# Patient Record
Sex: MALE | Race: WHITE | Employment: FULL TIME | URBAN - METROPOLITAN AREA
[De-identification: names, ages, dates, MRNs, and addresses within clinical notes are randomized per-mention and may not be internally consistent; named-entity substitution may affect disease eponyms.]

---

## 2019-11-21 ENCOUNTER — OFFICE VISIT (OUTPATIENT)
Dept: PEDIATRICS | Facility: CLINIC | Age: 39
End: 2019-11-21
Payer: COMMERCIAL

## 2019-11-21 VITALS
TEMPERATURE: 97.9 F | WEIGHT: 234.9 LBS | SYSTOLIC BLOOD PRESSURE: 114 MMHG | HEIGHT: 70 IN | BODY MASS INDEX: 33.63 KG/M2 | RESPIRATION RATE: 12 BRPM | OXYGEN SATURATION: 95 % | HEART RATE: 85 BPM | DIASTOLIC BLOOD PRESSURE: 80 MMHG

## 2019-11-21 DIAGNOSIS — F43.21 GRIEF REACTION: ICD-10-CM

## 2019-11-21 DIAGNOSIS — Z00.00 ROUTINE GENERAL MEDICAL EXAMINATION AT A HEALTH CARE FACILITY: Primary | ICD-10-CM

## 2019-11-21 DIAGNOSIS — Z72.0 TOBACCO ABUSE: ICD-10-CM

## 2019-11-21 DIAGNOSIS — Z23 NEED FOR TETANUS BOOSTER: ICD-10-CM

## 2019-11-21 DIAGNOSIS — Z82.49 FAMILY HISTORY OF ISCHEMIC HEART DISEASE: ICD-10-CM

## 2019-11-21 DIAGNOSIS — F34.1 DYSTHYMIA: ICD-10-CM

## 2019-11-21 DIAGNOSIS — Z12.5 SCREENING FOR PROSTATE CANCER: ICD-10-CM

## 2019-11-21 PROCEDURE — G0103 PSA SCREENING: HCPCS | Performed by: NURSE PRACTITIONER

## 2019-11-21 PROCEDURE — 80048 BASIC METABOLIC PNL TOTAL CA: CPT | Performed by: NURSE PRACTITIONER

## 2019-11-21 PROCEDURE — 80061 LIPID PANEL: CPT | Performed by: NURSE PRACTITIONER

## 2019-11-21 PROCEDURE — 99386 PREV VISIT NEW AGE 40-64: CPT | Performed by: NURSE PRACTITIONER

## 2019-11-21 PROCEDURE — 36415 COLL VENOUS BLD VENIPUNCTURE: CPT | Performed by: NURSE PRACTITIONER

## 2019-11-21 PROCEDURE — 99213 OFFICE O/P EST LOW 20 MIN: CPT | Mod: 25 | Performed by: NURSE PRACTITIONER

## 2019-11-21 RX ORDER — NICOTINE 21 MG/24HR
1 PATCH, TRANSDERMAL 24 HOURS TRANSDERMAL EVERY 24 HOURS
Qty: 21 PATCH | Refills: 1 | Status: SHIPPED | OUTPATIENT
Start: 2019-11-21 | End: 2021-02-02

## 2019-11-21 SDOH — HEALTH STABILITY: MENTAL HEALTH: HOW OFTEN DO YOU HAVE A DRINK CONTAINING ALCOHOL?: MONTHLY OR LESS

## 2019-11-21 SDOH — HEALTH STABILITY: MENTAL HEALTH: HOW MANY STANDARD DRINKS CONTAINING ALCOHOL DO YOU HAVE ON A TYPICAL DAY?: 1 OR 2

## 2019-11-21 ASSESSMENT — ENCOUNTER SYMPTOMS: NERVOUS/ANXIOUS: 1

## 2019-11-21 ASSESSMENT — MIFFLIN-ST. JEOR: SCORE: 1976.75

## 2019-11-21 NOTE — PROGRESS NOTES
SUBJECTIVE:   CC: Felix Winslow is an 39 year old male who presents for preventative health visit.     Healthy Habits:     Getting at least 3 servings of Calcium per day:  Yes    Bi-annual eye exam:  NO    Dental care twice a year:  NO    Sleep apnea or symptoms of sleep apnea:  None    Diet:  Carbohydrate counting    Frequency of exercise:  2-3 days/week    Duration of exercise:  30-45 minutes    Taking medications regularly:  Yes    Barriers to taking medications:  None    Medication side effects:  Not applicable    PHQ-2 Total Score: 1    Additional concerns today:  Yes    Seen at  ED in July of this year for chest pain  Workup unremarkable  Father had MI at 72 and his paternal grandfather had MI in his 50s  His father passed away 3 years ago and then his mother passed away 2 years ago from metastatic cancer (unsure of primary)  This has caused him a lot of grief and fear of his own mortality  He states he often thinks about death  He is scheduled for first visit with a therapist today    He uses tobacco chew and wants to quit    He has felt unwell for the past week  Hard to describe  Wakes up with chest pressure but has also been exercising daily without any symptoms  Reports that he feels better when exercising  He drinks 4 cups of coffee daily and feels like he urinates frequently during the day  He did not drink any coffee yesterday and did not have urinary frequency  Otherwise denies fever, chills, nausea, vomiting, unintended weight loss, night sweats, cough, SOB, body aches    He is a  for the [employer]  Had his physical last year through the team doctor  States that he has been having PSAs and would like this done  No family history of prostate cancer        Today's PHQ-2 Score:   PHQ-2 ( 1999 Pfizer) 11/21/2019   Q1: Little interest or pleasure in doing things 1   Q2: Feeling down, depressed or hopeless 0   PHQ-2 Score 1   Q1: Little interest or pleasure in doing things Several days   Q2:  Feeling down, depressed or hopeless Not at all   PHQ-2 Score 1       Abuse: Current or Past(Physical, Sexual or Emotional)- No  Do you feel safe in your environment? Yes        Social History     Tobacco Use     Smoking status: Never Smoker     Smokeless tobacco: Current User   Substance Use Topics     Alcohol use: Yes     Frequency: Monthly or less     Drinks per session: 1 or 2         Alcohol Use 11/21/2019   Prescreen: >3 drinks/day or >7 drinks/week? No       Last PSA: No results found for: PSA    Reviewed orders with patient. Reviewed health maintenance and updated orders accordingly - Yes  Lab work is in process  Labs reviewed in EPIC  BP Readings from Last 3 Encounters:   11/21/19 114/80    Wt Readings from Last 3 Encounters:   11/21/19 106.5 kg (234 lb 14.4 oz)                  There is no problem list on file for this patient.    No past surgical history on file.    Social History     Tobacco Use     Smoking status: Never Smoker     Smokeless tobacco: Current User   Substance Use Topics     Alcohol use: Yes     Frequency: Monthly or less     Drinks per session: 1 or 2     Family History   Problem Relation Age of Onset     Cancer Mother      Myocardial Infarction Father 72     Diabetes Father      Emphysema Maternal Grandmother      Myocardial Infarction Paternal Grandfather            Reviewed and updated as needed this visit by clinical staff  Tobacco  Allergies  Soc Hx        Reviewed and updated as needed this visit by Provider        No past medical history on file.     Review of Systems  CONSTITUTIONAL: NEGATIVE for fever, chills, change in weight  INTEGUMENTARY/SKIN: NEGATIVE for worrisome rashes, moles or lesions  EYES: NEGATIVE for vision changes or irritation  ENT: NEGATIVE for ear, mouth and throat problems  RESP: NEGATIVE for significant cough or SOB  CV: NEGATIVE for chest pain, palpitations or peripheral edema  GI: NEGATIVE for nausea, abdominal pain, heartburn, or change in bowel habits    "male: negative for dysuria, hematuria, decreased urinary stream, erectile dysfunction, urethral discharge  MUSCULOSKELETAL: NEGATIVE for significant arthralgias or myalgia  NEURO: NEGATIVE for weakness, dizziness or paresthesias  PSYCHIATRIC: NEGATIVE for changes in mood or affect    OBJECTIVE:   /80 (BP Location: Right arm, Patient Position: Chair, Cuff Size: Adult Regular)   Pulse 85   Temp 97.9  F (36.6  C) (Oral)   Resp 12   Ht 1.778 m (5' 10\")   Wt 106.5 kg (234 lb 14.4 oz)   SpO2 95%   BMI 33.70 kg/m      Physical Exam  GENERAL: healthy, alert and no distress  EYES: Eyes grossly normal to inspection, PERRL and conjunctivae and sclerae normal  HENT: ear canals and TM's normal, nose and mouth without ulcers or lesions  NECK: no adenopathy, no asymmetry, masses, or scars and thyroid normal to palpation  RESP: lungs clear to auscultation - no rales, rhonchi or wheezes  CV: regular rate and rhythm, normal S1 S2, no S3 or S4, no murmur, click or rub, no peripheral edema and peripheral pulses strong  ABDOMEN: soft, nontender, no hepatosplenomegaly, no masses and bowel sounds normal  MS: no gross musculoskeletal defects noted, no edema  SKIN: no suspicious lesions or rashes  NEURO: Normal strength and tone, mentation intact and speech normal  PSYCH: mentation appears normal, affect normal/bright    Diagnostic Test Results:  none     ASSESSMENT/PLAN:   1. Routine general medical examination at a health care facility  - Lipid panel reflex to direct LDL Fasting  - Basic metabolic panel  - PSA, screen    2. Tobacco abuse  - Strongly encourage cessation, especially with family history of ASCVD. Discussed medications to quit. Will try nicotine patch and consider bupropion. He will discuss this with his therapist  - nicotine (NICODERM CQ) 21 MG/24HR 24 hr patch; Place 1 patch onto the skin every 24 hours  Dispense: 21 patch; Refill: 1    3. Family history of ischemic heart disease  - Recommend exercising 150 " "minutes/week. Discussed reducing risk factors including overweight, tobacco and checking lipids. Check with insurance on coverage for screening at [facility]  - CARDIOLOGY EVAL ADULT REFERRAL    4. Need for tetanus booster  - Not needed. Boostrix in 2015    5. Screening for prostate cancer  - Advised that PSA screening not recommended until age 50, but I will check per patient request    6. Grief reaction  - Seeing therapist today. May take several sessions to start seeing improvement. He has a lot of anxiety around his own health. Asks about stress testing and chest imaging with his family history. Advised this would not be recommended as he is asymptomatic. Close monitoring. If he is able to get into regular exercise routine and tolerate without symptoms, and having progress with therapist, ok to f/u next year for annual exam. Otherwise, f/u sooner. He will notify me if becoming symptomatic with exercise, or dysthymia worsening    7. Dysthymia  - see above      COUNSELING:   Reviewed preventive health counseling, as reflected in patient instructions       Regular exercise       Healthy diet/nutrition       Prostate cancer screening    Estimated body mass index is 33.7 kg/m  as calculated from the following:    Height as of this encounter: 1.778 m (5' 10\").    Weight as of this encounter: 106.5 kg (234 lb 14.4 oz).     Weight management plan: Discussed healthy diet and exercise guidelines     reports that he has never smoked. He uses smokeless tobacco.  Tobacco Cessation Action Plan: Pharmacotherapies : Nicotine patch    Counseling Resources:  ATP IV Guidelines  Pooled Cohorts Equation Calculator  FRAX Risk Assessment  ICSI Preventive Guidelines  Dietary Guidelines for Americans, 2010  USDA's MyPlate  ASA Prophylaxis  Lung CA Screening    OFELIA Garsia [facility] [place]  "

## 2019-11-21 NOTE — PATIENT INSTRUCTIONS
Apply 1 nicotine patch every morning. Apply new patch every morning  Wear 21 mg patch daily for 6 weeks then reduce to 14 mg patch  Call clinic in 6 weeks and I can send in 14 mg patch    Let me know if you want to try adding Bupropion (Wellbutrin)  You can discuss this with your therapist as well    I put in a referral for [facility]. You can call and ask about insurance coverage  The most important things you can do for your heart and your body are to quit tobacco and get into an exercise routine    Work on increasing exercise. You should be exercise 150 minutes/week  Let me know if you are having symptoms with exercise such as shortness of breath, chest pressure or fatigue    Let me know if you are not feeling better. Don't hesitate to call clinic or send me a finalsitet message  If you are feeling well, follow up for annual exam in 1 year, otherwise come see me sooner      Preventive Health Recommendations  Male Ages 40 to 49    Yearly exam:             See your health care provider every year in order to  o   Review health changes.   o   Discuss preventive care.    o   Review your medicines if your doctor has prescribed any.    You should be tested each year for STDs (sexually transmitted diseases) if you re at risk.     Have a cholesterol test every 5 years.     Have a colonoscopy (test for colon cancer) if someone in your family has had colon cancer or polyps before age 50.     After age 45, have a diabetes test (fasting glucose). If you are at risk for diabetes, you should have this test every 3 years.      Talk with your health care provider about whether or not a prostate cancer screening test (PSA) is right for you.    Shots: Get a flu shot each year. Get a tetanus shot every 10 years.     Nutrition:    Eat at least 5 servings of fruits and vegetables daily.     Eat whole-grain bread, whole-wheat pasta and brown rice instead of white grains and rice.     Get adequate Calcium and Vitamin D.      Lifestyle    Exercise for at least 150 minutes a week (30 minutes a day, 5 days a week). This will help you control your weight and prevent disease.     Limit alcohol to one drink per day.     No smoking.     Wear sunscreen to prevent skin cancer.     See your dentist every six months for an exam and cleaning.

## 2019-11-21 NOTE — LETTER
[facility]  [address]  [place] [address]  [phone]      November 22, 2019    Felix Winslow                                                                                                                                                       [address]  [place] [address]        Dear Felix,     The results of your recent labs are enclosed.   Your cholesterol is elevated, but not high enough that you need to be treated with a medication. Reduce your consumption of cholesterol and saturated fats and eliminate trans fats from your diet. Increase your consumption of healthy fats (nuts, fish, seafood, avocado, olive oil), whole grains and fruits and vegetables. I also recommend exercising 150 minutes a week. These changes will help to improve your cholesterol.   There is something called the [place] risk score (see below). It gives us your 10 year risk of developing cardiovascular disease. Your risk score is 1.3% which is good. I would not recommend starting a statin unless you risk score was greater than 6.5%.   Prostate cancer screening was negative.   Your fasting blood sugar (glucose) is normal.     The 10-year ASCVD risk score (Narciso LAUREN Jr., et al., 2013) is: 1.3%     Values used to calculate the score:       Age: 39 years       Sex: Male       Is Non- : No       Diabetic: No       Tobacco smoker: No       Systolic Blood Pressure: 114 mmHg       Is BP treated: No       HDL Cholesterol: 45 mg/dL       Total Cholesterol: 206 mg/dL     Please call the clinic if you have any concerns.     Sincerely,     OFELIA Garsia CNP     Your [facility] Care Team

## 2019-11-22 LAB
ANION GAP SERPL CALCULATED.3IONS-SCNC: 6 MMOL/L (ref 3–14)
BUN SERPL-MCNC: 18 MG/DL (ref 7–30)
CALCIUM SERPL-MCNC: 8.8 MG/DL (ref 8.5–10.1)
CHLORIDE SERPL-SCNC: 107 MMOL/L (ref 94–109)
CHOLEST SERPL-MCNC: 206 MG/DL
CO2 SERPL-SCNC: 25 MMOL/L (ref 20–32)
CREAT SERPL-MCNC: 1.03 MG/DL (ref 0.66–1.25)
GFR SERPL CREATININE-BSD FRML MDRD: 90 ML/MIN/{1.73_M2}
GLUCOSE SERPL-MCNC: 98 MG/DL (ref 70–99)
HDLC SERPL-MCNC: 45 MG/DL
LDLC SERPL CALC-MCNC: 140 MG/DL
NONHDLC SERPL-MCNC: 161 MG/DL
POTASSIUM SERPL-SCNC: 4.2 MMOL/L (ref 3.4–5.3)
PSA SERPL-ACNC: 0.8 UG/L (ref 0–4)
SODIUM SERPL-SCNC: 138 MMOL/L (ref 133–144)
TRIGL SERPL-MCNC: 106 MG/DL

## 2019-11-22 NOTE — RESULT ENCOUNTER NOTE
[facility]  [address]  [address]  [place] [address]  Phone: [phone]  Fax: [phone]      11/22/19    Felix Winslow  [place] [address]      Dear Felix,    The results of your recent labs are enclosed.  Your cholesterol is elevated, but not high enough that you need to be treated with a medication. Reduce your consumption of cholesterol and saturated fats and eliminate trans fats from your diet. Increase your consumption of healthy fats (nuts, fish, seafood, avocado, olive oil), whole grains and fruits and vegetables. I also recommend exercising 150 minutes a week. These changes will help to improve your cholesterol.   There is something called the [place] risk score (see below). It gives us your 10 year risk of developing cardiovascular disease. Your risk score is 1.3% which is good. I would not recommend starting a statin unless you risk score was greater than 6.5%.   Prostate cancer screening was negative.   Your fasting blood sugar (glucose) is normal.    The 10-year ASCVD risk score (Narciso LAUREN Jr., et al., 2013) is: 1.3%    Values used to calculate the score:      Age: 39 years      Sex: Male      Is Non- : No      Diabetic: No      Tobacco smoker: No      Systolic Blood Pressure: 114 mmHg      Is BP treated: No      HDL Cholesterol: 45 mg/dL      Total Cholesterol: 206 mg/dL    Please call the clinic if you have any concerns.    Sincerely,    OFELIA Garsia CNP    Your [facility] Care Team

## 2020-01-02 ENCOUNTER — NURSE TRIAGE (OUTPATIENT)
Dept: PEDIATRICS | Facility: CLINIC | Age: 40
End: 2020-01-02

## 2020-01-02 NOTE — TELEPHONE ENCOUNTER
"  Additional Information    Mild rectal pain    Answer Assessment - Initial Assessment Questions  1. SYMPTOM:  \"What's the main symptom you're concerned about?\" (e.g., pain, itching, swelling, rash)      Small small bulge on the outside of his anus.    2. ONSET: \"When did the bulge  start?\"      Noticed while going to the bathroom 1 hour ago.  3. RECTAL PAIN: \"Do you have any pain around your rectum?\" \"How bad is the pain?\"  (Scale 1-10; or mild, moderate, severe)   - MILD (1-3): doesn't interfere with normal activities    - MODERATE (4-7): interferes with normal activities or awakens from sleep, limping    - SEVERE (8-10): excruciating pain, unable to have a bowel movement       Mild  4. RECTAL ITCHING: \"Do you have any itching in this area?\" \"How bad is the itching?\"  (Scale 1-10; or mild, moderate, severe)   - MILD - doesn't interfere with normal activities    - MODERATE-SEVERE: interferes with normal activities or awakens from sleep      Minimal   5. CONSTIPATION: \"Do you have constipation?\" If so, \"How bad is it?\"      Straining with BM's for the last day or two.   6. CAUSE: \"What do you think is causing the anus symptoms?\"      Going to the bathroom.   7. OTHER SYMPTOMS: \"Do you have any other symptoms?\"  (e.g., rectal bleeding, abdominal pain, vomiting, fever)      none  8. PREGNANCY: \"Is there any chance you are pregnant?\" \"When was your last menstrual period?\"      n/a    Protocols used: RECTAL SYMPTOMS-A-[place]    "

## 2020-01-02 NOTE — TELEPHONE ENCOUNTER
Reason for call:  Symptom   Symptom or request: Hemmeroids    Duration (how long have symptoms been present): a while  Have you been treated for this before? Yes    Additional comments: Wants to speak to a nurse    Phone number to reach patient:  Home number on file [phone] (home)    Best Time:  any    Can we leave a detailed message on this number?  YES

## 2020-03-23 ENCOUNTER — TELEPHONE (OUTPATIENT)
Dept: PEDIATRICS | Facility: CLINIC | Age: 40
End: 2020-03-23

## 2020-03-23 ENCOUNTER — OFFICE VISIT (OUTPATIENT)
Dept: URGENT CARE | Facility: URGENT CARE | Age: 40
End: 2020-03-23
Payer: COMMERCIAL

## 2020-03-23 VITALS
SYSTOLIC BLOOD PRESSURE: 132 MMHG | HEART RATE: 85 BPM | BODY MASS INDEX: 33.58 KG/M2 | OXYGEN SATURATION: 97 % | WEIGHT: 234 LBS | DIASTOLIC BLOOD PRESSURE: 74 MMHG | TEMPERATURE: 98.8 F

## 2020-03-23 DIAGNOSIS — K92.1 BLACK STOOL: Primary | ICD-10-CM

## 2020-03-23 PROBLEM — E66.9 OBESITY: Status: ACTIVE | Noted: 2017-01-27

## 2020-03-23 PROBLEM — Z72.0 TOBACCO USE: Status: ACTIVE | Noted: 2017-01-27

## 2020-03-23 PROBLEM — G44.209 TENSION HEADACHE: Status: ACTIVE | Noted: 2017-01-27

## 2020-03-23 PROBLEM — K58.9 IRRITABLE BOWEL SYNDROME: Status: ACTIVE | Noted: 2017-01-27

## 2020-03-23 PROBLEM — M54.50 LOW BACK PAIN: Status: ACTIVE | Noted: 2017-01-27

## 2020-03-23 LAB
ALBUMIN UR-MCNC: NEGATIVE MG/DL
APPEARANCE UR: CLEAR
BASOPHILS # BLD AUTO: 0 10E9/L (ref 0–0.2)
BASOPHILS NFR BLD AUTO: 0.3 %
BILIRUB UR QL STRIP: NEGATIVE
COLOR UR AUTO: YELLOW
DIFFERENTIAL METHOD BLD: NORMAL
EOSINOPHIL # BLD AUTO: 0 10E9/L (ref 0–0.7)
EOSINOPHIL NFR BLD AUTO: 0.4 %
ERYTHROCYTE [DISTWIDTH] IN BLOOD BY AUTOMATED COUNT: 12.5 % (ref 10–15)
GLUCOSE UR STRIP-MCNC: NEGATIVE MG/DL
HCT VFR BLD AUTO: 47.8 % (ref 40–53)
HGB BLD-MCNC: 16.7 G/DL (ref 13.3–17.7)
HGB UR QL STRIP: ABNORMAL
KETONES UR STRIP-MCNC: NEGATIVE MG/DL
LEUKOCYTE ESTERASE UR QL STRIP: NEGATIVE
LYMPHOCYTES # BLD AUTO: 1.8 10E9/L (ref 0.8–5.3)
LYMPHOCYTES NFR BLD AUTO: 25.5 %
MCH RBC QN AUTO: 29.3 PG (ref 26.5–33)
MCHC RBC AUTO-ENTMCNC: 34.9 G/DL (ref 31.5–36.5)
MCV RBC AUTO: 84 FL (ref 78–100)
MONOCYTES # BLD AUTO: 0.5 10E9/L (ref 0–1.3)
MONOCYTES NFR BLD AUTO: 6.8 %
NEUTROPHILS # BLD AUTO: 4.7 10E9/L (ref 1.6–8.3)
NEUTROPHILS NFR BLD AUTO: 67 %
NITRATE UR QL: NEGATIVE
NON-SQ EPI CELLS #/AREA URNS LPF: ABNORMAL /LPF
PH UR STRIP: 5.5 PH (ref 5–7)
PLATELET # BLD AUTO: 264 10E9/L (ref 150–450)
RBC # BLD AUTO: 5.69 10E12/L (ref 4.4–5.9)
RBC #/AREA URNS AUTO: ABNORMAL /HPF
SOURCE: ABNORMAL
SP GR UR STRIP: 1.01 (ref 1–1.03)
UROBILINOGEN UR STRIP-ACNC: 0.2 EU/DL (ref 0.2–1)
WBC # BLD AUTO: 7 10E9/L (ref 4–11)
WBC #/AREA URNS AUTO: ABNORMAL /HPF

## 2020-03-23 PROCEDURE — 81001 URINALYSIS AUTO W/SCOPE: CPT | Performed by: NURSE PRACTITIONER

## 2020-03-23 PROCEDURE — 36415 COLL VENOUS BLD VENIPUNCTURE: CPT | Performed by: NURSE PRACTITIONER

## 2020-03-23 PROCEDURE — 85025 COMPLETE CBC W/AUTO DIFF WBC: CPT | Performed by: NURSE PRACTITIONER

## 2020-03-23 PROCEDURE — 99214 OFFICE O/P EST MOD 30 MIN: CPT | Performed by: NURSE PRACTITIONER

## 2020-03-23 ASSESSMENT — ENCOUNTER SYMPTOMS
CONSTIPATION: 0
DIZZINESS: 0
LIGHT-HEADEDNESS: 0
ABDOMINAL PAIN: 1
FEVER: 0
BLOOD IN STOOL: 0
COUGH: 0
HEADACHES: 0
DIARRHEA: 0
BACK PAIN: 0
NAUSEA: 0
DYSURIA: 0
RECTAL PAIN: 0
MYALGIAS: 0
VOMITING: 0

## 2020-03-23 NOTE — TELEPHONE ENCOUNTER
"Patient called clinic to schedule appointment for possible blood in stool with PCP.     Reports 2 days dark black stools, no visible blood and dull abdominal pain. Has had 3 bowel movements today, soft.     No urinary symptoms, thinks its ulcer but has never had one. Bough  TUMS today reports some relief, tried pepto bismol last night.     No fever, no respiratory symptoms. Has been self quarantine for 15 days post travel to [facility]. No known exposure or symptoms during 15 days.     Patient states \" I just pulled in should I just come in and talk to you\" Educated patient on protocols in place due to COVID19, advised PCP is not in clinic, and due to patient showing up to clinic, advised UC visit.  "

## 2020-03-23 NOTE — PROGRESS NOTES
"SUBJECTIVE:   Felix Winslow is a 39 year old male presenting with a chief complaint of   Chief Complaint   Patient presents with     Urgent Care     Rectal Problem     Blood in stool for a few days. Blackish coloring. Denies abdominal pain currently. Pt says he is having normal consistant stools.        He is an established patient of [facility].  Patient presents today with complaints of black colored stool for the last 2 days. States there is no visible blood in the stool. He does mention he can get sporadic dull generalized abdominal pain, but denies any current abdominal pain. He states he has been having normal consistent stools up to the this point. He denies any new diet changes. He has not had any abdominal surgeries. He does have history of IBS, but \"hasn't taken anything for it in years.\" He denies history of Crohn's or Ulcerative colitis. He does mention he stopped drinking coffee today and has just been doing water. He has taken some Tums with releif. Has also done pepto bismol.       Review of Systems   Constitutional: Negative for fever.   HENT: Negative for congestion.    Respiratory: Negative for cough.    Gastrointestinal: Positive for abdominal pain. Negative for blood in stool, constipation, diarrhea, nausea, rectal pain and vomiting.   Genitourinary: Negative for dysuria.   Musculoskeletal: Negative for back pain and myalgias.   Skin: Negative for rash.   Neurological: Negative for dizziness, light-headedness and headaches.       History reviewed. No pertinent past medical history.  Family History   Problem Relation Age of Onset     Cancer Mother      Myocardial Infarction Father 72     Diabetes Father      Emphysema Maternal Grandmother      Myocardial Infarction Paternal Grandfather      Current Outpatient Medications   Medication Sig Dispense Refill     omeprazole (PRILOSEC) 20 MG DR capsule Take 1 capsule (20 mg) by mouth daily 30 capsule 0     nicotine (NICODERM CQ) 21 MG/24HR 24 hr patch " Place 1 patch onto the skin every 24 hours (Patient not taking: Reported on 3/23/2020) 21 patch 1     Social History     Tobacco Use     Smoking status: Never Smoker     Smokeless tobacco: Current User   Substance Use Topics     Alcohol use: Yes     Frequency: Monthly or less     Drinks per session: 1 or 2       OBJECTIVE  /74   Pulse 85   Temp 98.8  F (37.1  C) (Tympanic)   Wt 106.1 kg (234 lb)   SpO2 97%   BMI 33.58 kg/m      Physical Exam  Vitals signs and nursing note reviewed.   Constitutional:       Appearance: Normal appearance. He is well-developed and well-groomed.   Cardiovascular:      Rate and Rhythm: Normal rate and regular rhythm.      Heart sounds: Normal heart sounds.   Pulmonary:      Effort: Pulmonary effort is normal.      Breath sounds: Normal breath sounds and air entry.   Abdominal:      General: Bowel sounds are normal.      Palpations: Abdomen is soft.      Tenderness: There is abdominal tenderness (rates 1/10 with palpation to epigastric, LUQ, LLQ). There is no right CVA tenderness or left CVA tenderness.   Lymphadenopathy:      Head:      Right side of head: No submandibular or tonsillar adenopathy.      Left side of head: No submandibular or tonsillar adenopathy.      Cervical: No cervical adenopathy.   Skin:     General: Skin is warm and dry.      Capillary Refill: Capillary refill takes less than 2 seconds.   Neurological:      Mental Status: He is alert and oriented to person, place, and time.      GCS: GCS eye subscore is 4. GCS verbal subscore is 5. GCS motor subscore is 6.   Psychiatric:         Behavior: Behavior is cooperative.         Labs:  Results for orders placed or performed in visit on 03/23/20 (from the past 24 hour(s))   *UA reflex to Microscopic and Culture ([place] and [facility] (except [place] and [place])    Specimen: Midstream Urine   Result Value Ref Range    Color Urine Yellow     Appearance Urine Clear     Glucose Urine Negative NEG^Negative  mg/dL    Bilirubin Urine Negative NEG^Negative    Ketones Urine Negative NEG^Negative mg/dL    Specific Gravity Urine 1.010 1.003 - 1.035    Blood Urine Moderate (A) NEG^Negative    pH Urine 5.5 5.0 - 7.0 pH    Protein Albumin Urine Negative NEG^Negative mg/dL    Urobilinogen Urine 0.2 0.2 - 1.0 EU/dL    Nitrite Urine Negative NEG^Negative    Leukocyte Esterase Urine Negative NEG^Negative    Source Midstream Urine    CBC with platelets and differential   Result Value Ref Range    WBC 7.0 4.0 - 11.0 10e9/L    RBC Count 5.69 4.4 - 5.9 10e12/L    Hemoglobin 16.7 13.3 - 17.7 g/dL    Hematocrit 47.8 40.0 - 53.0 %    MCV 84 78 - 100 fl    MCH 29.3 26.5 - 33.0 pg    MCHC 34.9 31.5 - 36.5 g/dL    RDW 12.5 10.0 - 15.0 %    Platelet Count 264 150 - 450 10e9/L    % Neutrophils 67.0 %    % Lymphocytes 25.5 %    % Monocytes 6.8 %    % Eosinophils 0.4 %    % Basophils 0.3 %    Absolute Neutrophil 4.7 1.6 - 8.3 10e9/L    Absolute Lymphocytes 1.8 0.8 - 5.3 10e9/L    Absolute Monocytes 0.5 0.0 - 1.3 10e9/L    Absolute Eosinophils 0.0 0.0 - 0.7 10e9/L    Absolute Basophils 0.0 0.0 - 0.2 10e9/L    Diff Method Automated Method    Urine Microscopic   Result Value Ref Range    WBC Urine 0 - 5 OTO5^0 - 5 /HPF    RBC Urine 5-10 (A) OTO2^O - 2 /HPF    Squamous Epithelial /LPF Urine Few FEW^Few /LPF         ASSESSMENT:      ICD-10-CM    1. Black stool  K92.1 *UA reflex to Microscopic and Culture ([place] and [facility] (except [place] and [place])     CBC with platelets and differential     Urine Microscopic     Occult blood fecal HGB immuno     omeprazole (PRILOSEC) 20 MG DR capsule     CANCELED: Occult blood stool        Medical Decision Making:    Differential Diagnosis: Gastritis, Ulcer, Hemorrhoid, Mediation Side Effect.    Serious Comorbid Conditions:  Adult:  None    PLAN:  Discussed with patient that CBC was unremarkable. UA does show moderate amount of blood. Patient sent home with home collection kit for occult blood.  Discussed will start him on omeprazole to be taken daily. Push fluids. Avoid alcohol and tobacco. Education was added to AVS. Patient was agreeable to plan and verbalized understanding.     Followup:    If not improving in 1 week or if condition worsens, follow up with your Primary Care Provider    Patient Instructions   Start Omeprazole (prilosec) 20 mg daily      Patient Education     Gastritis or Ulcer, No Antibiotic Treatment    Gastritis is irritation and inflammation of the stomach lining. This means the lining is red and swollen. It can cause shallow sores in the stomach lining called erosions. An ulcer is a deeper open sore in the lining of the stomach. It may also occur in the first part of the small intestine (duodenum). The causes and symptoms of gastritis and ulcers are very similar.  Causes and risk factors for both problems can include:    Long-term use of nonsteroidal anti-inflammatory drugs (NSAIDs), such as aspirin and ibuprofen    H. pylori bacteria infection    Tobacco use    Alcohol use    Certain other conditions such as immune disorders, certain medicines (high-dose iron supplements) and street drugs (such as cocaine)  Symptoms for both problems can include:    Dull or burning pain in the upper part of the belly    Loss of appetite    Heartburn or upset stomach    Frequent burping    Bloated feeling    Nausea with or without vomiting  You likely had an evaluation to help find the exact cause and extent of your problem. This may have included a health history, exam, and certain tests.  Results showed that your problem is not due to H. pylori infection. For this reason, you don't need antibiotics as part of your treatment.  Whether your problem is gastritis or an ulcer, you will still need to take other medicines, however. You will also need to follow instructions to help reduce stomach irritation so your stomach can heal.   Home care    Take any medicines you re prescribed exactly as directed.  Common medicines used to treat gastritis include:  ? Antacids. These help neutralize the normal acids in your stomach.  ? Proton pump inhibitors. These block your stomach from making any acid.  ? H2 blockers. These reduce the amount of acid your stomach makes.  ? Bismuth subsalicylate. This helps protect the lining of your stomach from acid.    Don't take any NSAIDs during your treatment. If you take NSAID to help treat other health problems, tell your healthcare provider. He or she may need to adjust your medicine plan or change the dosage.    Don t use tobacco. Also don t drink alcohol. These products can increase the amount of acid your stomach makes. This can delay healing. It can also worsen symptoms.  Follow-up care  Follow up with your healthcare provider, or as advised. In some cases, more testing may be needed.  When to seek medical advice  Call your healthcare provider right away if any of these occur:    Fever of 100.4 F (38 C) or higher, or as directed by your healthcare provider    Stomach pain that worsens or moves to the lower right part of belly    Extreme fatigue    Weakness or dizziness    Continued weight loss    Frequent vomiting, blood in your vomit, or coffee ground-like substance in your vomit    Black, tarry, or bloody stools  Call 911  Call 911 if any of these occur:    Chest pain appears or worsens, or spreads to the back, neck, shoulder, or arm    Unusually fast heart rate    Trouble breathing or swallowing    Confusion    Extreme drowsiness or trouble waking up    Fainting    Large amounts of blood present in vomit or stool  Date Last Reviewed: 3/1/2018    0669-5013 [employer]. [address] [place] [address], [place] [address]. All rights reserved. This information is not intended as a substitute for professional medical care. Always follow your healthcare professional's instructions.

## 2020-03-23 NOTE — PATIENT INSTRUCTIONS
Start Omeprazole (prilosec) 20 mg daily      Patient Education     Gastritis or Ulcer, No Antibiotic Treatment    Gastritis is irritation and inflammation of the stomach lining. This means the lining is red and swollen. It can cause shallow sores in the stomach lining called erosions. An ulcer is a deeper open sore in the lining of the stomach. It may also occur in the first part of the small intestine (duodenum). The causes and symptoms of gastritis and ulcers are very similar.  Causes and risk factors for both problems can include:    Long-term use of nonsteroidal anti-inflammatory drugs (NSAIDs), such as aspirin and ibuprofen    H. pylori bacteria infection    Tobacco use    Alcohol use    Certain other conditions such as immune disorders, certain medicines (high-dose iron supplements) and street drugs (such as cocaine)  Symptoms for both problems can include:    Dull or burning pain in the upper part of the belly    Loss of appetite    Heartburn or upset stomach    Frequent burping    Bloated feeling    Nausea with or without vomiting  You likely had an evaluation to help find the exact cause and extent of your problem. This may have included a health history, exam, and certain tests.  Results showed that your problem is not due to H. pylori infection. For this reason, you don't need antibiotics as part of your treatment.  Whether your problem is gastritis or an ulcer, you will still need to take other medicines, however. You will also need to follow instructions to help reduce stomach irritation so your stomach can heal.   Home care    Take any medicines you re prescribed exactly as directed. Common medicines used to treat gastritis include:  ? Antacids. These help neutralize the normal acids in your stomach.  ? Proton pump inhibitors. These block your stomach from making any acid.  ? H2 blockers. These reduce the amount of acid your stomach makes.  ? Bismuth subsalicylate. This helps protect the lining of your  stomach from acid.    Don't take any NSAIDs during your treatment. If you take NSAID to help treat other health problems, tell your healthcare provider. He or she may need to adjust your medicine plan or change the dosage.    Don t use tobacco. Also don t drink alcohol. These products can increase the amount of acid your stomach makes. This can delay healing. It can also worsen symptoms.  Follow-up care  Follow up with your healthcare provider, or as advised. In some cases, more testing may be needed.  When to seek medical advice  Call your healthcare provider right away if any of these occur:    Fever of 100.4 F (38 C) or higher, or as directed by your healthcare provider    Stomach pain that worsens or moves to the lower right part of belly    Extreme fatigue    Weakness or dizziness    Continued weight loss    Frequent vomiting, blood in your vomit, or coffee ground-like substance in your vomit    Black, tarry, or bloody stools  Call 911  Call 911 if any of these occur:    Chest pain appears or worsens, or spreads to the back, neck, shoulder, or arm    Unusually fast heart rate    Trouble breathing or swallowing    Confusion    Extreme drowsiness or trouble waking up    Fainting    Large amounts of blood present in vomit or stool  Date Last Reviewed: 3/1/2018    7615-4231 [employer]. [address] [place] [address], [place] [address]. All rights reserved. This information is not intended as a substitute for professional medical care. Always follow your healthcare professional's instructions.

## 2020-03-24 ENCOUNTER — E-VISIT (OUTPATIENT)
Dept: PEDIATRICS | Facility: CLINIC | Age: 40
End: 2020-03-24

## 2020-03-24 ENCOUNTER — E-VISIT (OUTPATIENT)
Dept: PEDIATRICS | Facility: CLINIC | Age: 40
End: 2020-03-24
Payer: COMMERCIAL

## 2020-03-24 DIAGNOSIS — R19.5 DARK STOOLS: Primary | ICD-10-CM

## 2020-03-24 DIAGNOSIS — K92.1 BLACK STOOL: ICD-10-CM

## 2020-03-24 DIAGNOSIS — Z53.9 ERRONEOUS ENCOUNTER--DISREGARD: Primary | ICD-10-CM

## 2020-03-24 DIAGNOSIS — R31.21 ASYMPTOMATIC MICROSCOPIC HEMATURIA: ICD-10-CM

## 2020-03-24 PROCEDURE — 82274 ASSAY TEST FOR BLOOD FECAL: CPT | Performed by: NURSE PRACTITIONER

## 2020-03-24 PROCEDURE — 99422 OL DIG E/M SVC 11-20 MIN: CPT | Performed by: NURSE PRACTITIONER

## 2020-03-26 LAB — HEMOCCULT STL QL IA: NEGATIVE

## 2020-03-27 ENCOUNTER — TELEPHONE (OUTPATIENT)
Dept: PEDIATRICS | Facility: CLINIC | Age: 40
End: 2020-03-27

## 2020-03-27 ENCOUNTER — MYC MEDICAL ADVICE (OUTPATIENT)
Dept: PEDIATRICS | Facility: CLINIC | Age: 40
End: 2020-03-27

## 2020-03-27 NOTE — TELEPHONE ENCOUNTER
Please call patient. Let him know that stool test was negative - did NOT detect blood in the stool. I do not recommend endoscopy/colonoscopy at this time  Continue with omeprazole for 1 month and then can stop  Keep an eye on symptoms and let me know if having black or bloody stools in future or GI symptoms return after stopping omeprazole

## 2020-04-08 ENCOUNTER — MYC MEDICAL ADVICE (OUTPATIENT)
Dept: PEDIATRICS | Facility: CLINIC | Age: 40
End: 2020-04-08

## 2020-04-10 DIAGNOSIS — R31.21 ASYMPTOMATIC MICROSCOPIC HEMATURIA: ICD-10-CM

## 2020-04-10 LAB
ALBUMIN UR-MCNC: NEGATIVE MG/DL
APPEARANCE UR: CLEAR
BILIRUB UR QL STRIP: NEGATIVE
COLOR UR AUTO: YELLOW
GLUCOSE UR STRIP-MCNC: NEGATIVE MG/DL
HGB UR QL STRIP: NEGATIVE
KETONES UR STRIP-MCNC: NEGATIVE MG/DL
LEUKOCYTE ESTERASE UR QL STRIP: NEGATIVE
NITRATE UR QL: NEGATIVE
PH UR STRIP: 6.5 PH (ref 5–7)
SOURCE: NORMAL
SP GR UR STRIP: 1.02 (ref 1–1.03)
UROBILINOGEN UR STRIP-ACNC: 0.2 EU/DL (ref 0.2–1)

## 2020-04-10 PROCEDURE — 81003 URINALYSIS AUTO W/O SCOPE: CPT | Performed by: NURSE PRACTITIONER

## 2020-06-26 ENCOUNTER — MYC MEDICAL ADVICE (OUTPATIENT)
Dept: PEDIATRICS | Facility: CLINIC | Age: 40
End: 2020-06-26

## 2020-07-07 ENCOUNTER — MYC MEDICAL ADVICE (OUTPATIENT)
Dept: PEDIATRICS | Facility: CLINIC | Age: 40
End: 2020-07-07

## 2021-01-04 ENCOUNTER — HEALTH MAINTENANCE LETTER (OUTPATIENT)
Age: 41
End: 2021-01-04

## 2021-02-02 ENCOUNTER — OFFICE VISIT (OUTPATIENT)
Dept: PEDIATRICS | Facility: CLINIC | Age: 41
End: 2021-02-02
Payer: COMMERCIAL

## 2021-02-02 VITALS
HEART RATE: 73 BPM | BODY MASS INDEX: 33.6 KG/M2 | TEMPERATURE: 97.7 F | WEIGHT: 240 LBS | RESPIRATION RATE: 12 BRPM | SYSTOLIC BLOOD PRESSURE: 110 MMHG | DIASTOLIC BLOOD PRESSURE: 78 MMHG | HEIGHT: 71 IN | OXYGEN SATURATION: 100 %

## 2021-02-02 DIAGNOSIS — E78.1 HYPERTRIGLYCERIDEMIA: ICD-10-CM

## 2021-02-02 DIAGNOSIS — Z00.00 ENCOUNTER FOR PREVENTATIVE ADULT HEALTH CARE EXAMINATION: Primary | ICD-10-CM

## 2021-02-02 DIAGNOSIS — R73.01 IMPAIRED FASTING GLUCOSE: ICD-10-CM

## 2021-02-02 DIAGNOSIS — F17.220 CHEWING TOBACCO NICOTINE DEPENDENCE WITHOUT COMPLICATION: ICD-10-CM

## 2021-02-02 LAB
CHOLEST SERPL-MCNC: 193 MG/DL
GLUCOSE SERPL-MCNC: 101 MG/DL (ref 70–99)
HDLC SERPL-MCNC: 36 MG/DL
LDLC SERPL CALC-MCNC: 105 MG/DL
NONHDLC SERPL-MCNC: 157 MG/DL
TRIGL SERPL-MCNC: 259 MG/DL

## 2021-02-02 PROCEDURE — 36415 COLL VENOUS BLD VENIPUNCTURE: CPT | Performed by: NURSE PRACTITIONER

## 2021-02-02 PROCEDURE — 82947 ASSAY GLUCOSE BLOOD QUANT: CPT | Performed by: NURSE PRACTITIONER

## 2021-02-02 PROCEDURE — 99213 OFFICE O/P EST LOW 20 MIN: CPT | Mod: 25 | Performed by: NURSE PRACTITIONER

## 2021-02-02 PROCEDURE — 80061 LIPID PANEL: CPT | Performed by: NURSE PRACTITIONER

## 2021-02-02 PROCEDURE — 99396 PREV VISIT EST AGE 40-64: CPT | Performed by: NURSE PRACTITIONER

## 2021-02-02 RX ORDER — VARENICLINE TARTRATE 1 MG/1
1 TABLET, FILM COATED ORAL 2 TIMES DAILY
Qty: 60 TABLET | Refills: 3 | Status: SHIPPED | OUTPATIENT
Start: 2021-02-02 | End: 2022-06-01

## 2021-02-02 ASSESSMENT — PATIENT HEALTH QUESTIONNAIRE - PHQ9
5. POOR APPETITE OR OVEREATING: SEVERAL DAYS
SUM OF ALL RESPONSES TO PHQ QUESTIONS 1-9: 6

## 2021-02-02 ASSESSMENT — ENCOUNTER SYMPTOMS
SHORTNESS OF BREATH: 0
FEVER: 0
PALPITATIONS: 0
ABDOMINAL PAIN: 0
HEMATOCHEZIA: 0
CONSTIPATION: 0
CHILLS: 0
COUGH: 0
WEAKNESS: 0
NAUSEA: 0
FREQUENCY: 0
ARTHRALGIAS: 0
JOINT SWELLING: 0
MYALGIAS: 0
DIZZINESS: 0
DIARRHEA: 0
HEMATURIA: 0
SORE THROAT: 0
PARESTHESIAS: 0
HEADACHES: 1
NERVOUS/ANXIOUS: 1
EYE PAIN: 0
DYSURIA: 0
HEARTBURN: 0

## 2021-02-02 ASSESSMENT — ANXIETY QUESTIONNAIRES
6. BECOMING EASILY ANNOYED OR IRRITABLE: SEVERAL DAYS
5. BEING SO RESTLESS THAT IT IS HARD TO SIT STILL: SEVERAL DAYS
2. NOT BEING ABLE TO STOP OR CONTROL WORRYING: SEVERAL DAYS
7. FEELING AFRAID AS IF SOMETHING AWFUL MIGHT HAPPEN: NOT AT ALL
3. WORRYING TOO MUCH ABOUT DIFFERENT THINGS: SEVERAL DAYS
1. FEELING NERVOUS, ANXIOUS, OR ON EDGE: SEVERAL DAYS
IF YOU CHECKED OFF ANY PROBLEMS ON THIS QUESTIONNAIRE, HOW DIFFICULT HAVE THESE PROBLEMS MADE IT FOR YOU TO DO YOUR WORK, TAKE CARE OF THINGS AT HOME, OR GET ALONG WITH OTHER PEOPLE: NOT DIFFICULT AT ALL
GAD7 TOTAL SCORE: 6

## 2021-02-02 ASSESSMENT — MIFFLIN-ST. JEOR: SCORE: 2002.82

## 2021-02-02 NOTE — RESULT ENCOUNTER NOTE
Your glucose is mildly elevated. Normal fasting glucose is less than 100. Pre-diabetes is 100-124 and diabetes and glucose > 125.   Your triglycerides are also elevated which is also likely due to too much processed sugars.   Cut back on processed sugars and try to establish an exercise routine (goal for 100-150 minutes/week).   We should check labs in about 3 months. You can schedule a lab only appointment. We will check an A1c which tells us your average blood sugar over the past 3 months. Can also recheck cholesterol.   Manuela Sevilla, CNP

## 2021-02-02 NOTE — PROGRESS NOTES
SUBJECTIVE:   CC: Felix Winslow is an 40 year old male who presents for preventative health visit.       Patient has been advised of split billing requirements and indicates understanding: Yes  Healthy Habits:     Getting at least 3 servings of Calcium per day:  Yes    Bi-annual eye exam:  NO    Dental care twice a year:  NO    Sleep apnea or symptoms of sleep apnea:  None    Diet:  Regular (no restrictions)    Frequency of exercise:  1 day/week    Duration of exercise:  Less than 15 minutes    Taking medications regularly:  Not Applicable    Barriers to taking medications:  Not applicable    Medication side effects:  Not applicable    PHQ-2 Total Score: 2    Additional concerns today:  No      It has been a stressful year  Worried about his kids who are homeschooling d/t Covid  Ages 8 and 12  He is a  for the [employer]  Minimal work this year  He is still paid  Uses chew and would like to quit  Never smoked  Tried nicotine patch last year which wasn't helpful  Using nicotine packets now  Was doing better in the summer when the weather was nice  Not exercising now  Bought an elliptical which didn't work      Today's PHQ-2 Score:   PHQ-2 ( 1999 Pfizer) 2/2/2021   Q1: Little interest or pleasure in doing things 1   Q2: Feeling down, depressed or hopeless 1   PHQ-2 Score 2   Q1: Little interest or pleasure in doing things Several days   Q2: Feeling down, depressed or hopeless Several days   PHQ-2 Score 2       Abuse: Current or Past(Physical, Sexual or Emotional)- No  Do you feel safe in your environment? Yes        Social History     Tobacco Use     Smoking status: Never Smoker     Smokeless tobacco: Current User   Substance Use Topics     Alcohol use: Yes     Frequency: Monthly or less     Drinks per session: 1 or 2     If you drink alcohol do you typically have >3 drinks per day or >7 drinks per week? No    Alcohol Use 2/2/2021   Prescreen: >3 drinks/day or >7 drinks/week? No       Last PSA:   PSA   Date Value  Ref Range Status   11/21/2019 0.80 0 - 4 ug/L Final     Comment:     Assay Method:  Chemiluminescence using Siemens Vista analyzer       Reviewed orders with patient. Reviewed health maintenance and updated orders accordingly - Yes  Lab work is in process  Labs reviewed in EPIC  BP Readings from Last 3 Encounters:   02/02/21 110/78   03/23/20 132/74   11/21/19 114/80    Wt Readings from Last 3 Encounters:   02/02/21 108.9 kg (240 lb)   03/23/20 106.1 kg (234 lb)   11/21/19 106.5 kg (234 lb 14.4 oz)                  Patient Active Problem List   Diagnosis     Allergic rhinitis     Generalized anxiety disorder     Irritable bowel syndrome     Low back pain     Obesity     Rosacea     Tension headache     Tobacco use     Vitamin D deficiency     No past surgical history on file.    Social History     Tobacco Use     Smoking status: Never Smoker     Smokeless tobacco: Current User   Substance Use Topics     Alcohol use: Yes     Frequency: Monthly or less     Drinks per session: 1 or 2     Family History   Problem Relation Age of Onset     Cancer Mother      Myocardial Infarction Father 72     Diabetes Father      Emphysema Maternal Grandmother      Myocardial Infarction Paternal Grandfather          Current Outpatient Medications   Medication Sig Dispense Refill     varenicline (CHANTIX CHARLIE) 0.5 MG X 11 & 1 MG X 42 tablet Take 0.5 mg tab daily for 3 days, THEN 0.5 mg tab twice daily for 4 days, THEN 1 mg twice daily. 53 tablet 0     varenicline (CHANTIX) 1 MG tablet Take 1 tablet (1 mg) by mouth 2 times daily 60 tablet 3       Reviewed and updated as needed this visit by clinical staff                 Reviewed and updated as needed this visit by Provider                No past medical history on file.     Review of Systems   Constitutional: Negative for chills and fever.   HENT: Negative for congestion, ear pain, hearing loss and sore throat.    Eyes: Negative for pain and visual disturbance.   Respiratory: Negative for  "cough and shortness of breath.    Cardiovascular: Negative for chest pain, palpitations and peripheral edema.   Gastrointestinal: Negative for abdominal pain, constipation, diarrhea, heartburn, hematochezia and nausea.   Genitourinary: Negative for discharge, dysuria, frequency, genital sores, hematuria, impotence and urgency.   Musculoskeletal: Negative for arthralgias, joint swelling and myalgias.   Skin: Negative for rash.   Neurological: Positive for headaches. Negative for dizziness, weakness and paresthesias.   Psychiatric/Behavioral: Positive for mood changes. The patient is nervous/anxious.        OBJECTIVE:   /78 (BP Location: Right arm, Patient Position: Chair, Cuff Size: Adult Regular)   Pulse 73   Temp 97.7  F (36.5  C) (Tympanic)   Resp 12   Ht 1.791 m (5' 10.5\")   Wt 108.9 kg (240 lb)   SpO2 100%   BMI 33.95 kg/m      Physical Exam  GENERAL: healthy, alert and no distress  EYES: Eyes grossly normal to inspection, PERRL and conjunctivae and sclerae normal  HENT: ear canals and TM's normal, nose and mouth without ulcers or lesions  NECK: no adenopathy, no asymmetry, masses, or scars and thyroid normal to palpation  RESP: lungs clear to auscultation - no rales, rhonchi or wheezes  CV: regular rate and rhythm, normal S1 S2, no S3 or S4, no murmur, click or rub, no peripheral edema and peripheral pulses strong  ABDOMEN: soft, nontender, no hepatosplenomegaly, no masses and bowel sounds normal  MS: no gross musculoskeletal defects noted, no edema  SKIN: no suspicious lesions or rashes  NEURO: Normal strength and tone, mentation intact and speech normal  PSYCH: mentation appears normal, affect flat    Diagnostic Test Results:  Labs reviewed in Epic    ASSESSMENT/PLAN:       ICD-10-CM    1. Encounter for preventative adult health care examination  Z00.00 Lipid Profile (Chol, Trig, HDL, LDL calc)     Glucose   2. Chewing tobacco nicotine dependence without complication  F17.220 varenicline (CHANTIX " "CHARLIE) 0.5 MG X 11 & 1 MG X 42 tablet     varenicline (CHANTIX) 1 MG tablet   3. Impaired fasting glucose  R73.01 Hemoglobin A1c   4. Hypertriglyceridemia  E78.1 Lipid panel reflex to direct LDL Fasting     Discussed medication options for nicotine dependence. Recommend Chantix. Reviewed risks and benefits. Advised Chantix in combination with NRT most effective options. He would like to just try Chantix.   Recommend quitplan which he is not interested  Strongly encourage regular exercise. Affect is noticeably flat this year. Exercise to help with stress, anxiety, quitting nicotine and reduce cardiovascular disease risk  See result note. Lab unable to add on A1c today. Schedule lab only appointment in 3 months to check A1c and lipids    Patient has been advised of split billing requirements and indicates understanding: Yes  COUNSELING:   Reviewed preventive health counseling, as reflected in patient instructions       Regular exercise       Healthy diet/nutrition    Estimated body mass index is 33.58 kg/m  as calculated from the following:    Height as of 11/21/19: 1.778 m (5' 10\").    Weight as of 3/23/20: 106.1 kg (234 lb).         He reports that he has never smoked. He uses smokeless tobacco.      Counseling Resources:  ATP IV Guidelines  Pooled Cohorts Equation Calculator  FRAX Risk Assessment  ICSI Preventive Guidelines  Dietary Guidelines for Americans, 2010  USDA's MyPlate  ASA Prophylaxis  Lung CA Screening    OFELIA Garsia CNP [facility]  "

## 2021-02-02 NOTE — LETTER
My Depression Action Plan  Name: Felix Winslow   Date of Birth 1980  Date: 2/2/2021    My doctor: Manuela Sevilla   My clinic: [facility]  [address]  [address]  [place] [id number] [phone]          GREEN    ZONE   Good Control    What it looks like:     Things are going generally well. You have normal ups and downs. You may even feel depressed from time to time, but bad moods usually last less than a day.   What you need to do:  1. Continue to care for yourself (see self care plan)  2. Check your depression survival kit and update it as needed  3. Follow your physician s recommendations including any medication.  4. Do not stop taking medication unless you consult with your physician first.           YELLOW         ZONE Getting Worse    What it looks like:     Depression is starting to interfere with your life.     It may be hard to get out of bed; you may be starting to isolate yourself from others.    Symptoms of depression are starting to last most all day and this has happened for several days.     You may have suicidal thoughts but they are not constant.   What you need to do:     1. Call your care team. Your response to treatment will improve if you keep your care team informed of your progress. Yellow periods are signs an adjustment may need to be made.     2. Continue your self-care.  Just get dressed and ready for the day.  Don't give yourself time to talk yourself out of it.    3. Talk to someone in your support network.    4. Open up your Depression Self-Care Plan/Wellness Kit.           RED    ZONE Medical Alert - Get Help    What it looks like:     Depression is seriously interfering with your life.     You may experience these or other symptoms: You can t get out of bed most days, can t work or engage in other necessary activities, you have trouble taking care of basic hygiene, or basic responsibilities, thoughts of suicide or  death that will not go away, self-injurious behavior.     What you need to do:  1. Call your care team and request a same-day appointment. If they are not available (weekends or after hours) call your local crisis line, emergency room or 911.          Depression Self-Care Plan / Wellness Kit    Many people find that medication and therapy are helpful treatments for managing depression. In addition, making small changes to your everyday life can help to boost your mood and improve your wellbeing. Below are some tips for you to consider. Be sure to talk with your medical provider and/or behavioral health consultant if your symptoms are worsening or not improving.     Sleep   Sleep hygiene  means all of the habits that support good, restful sleep. It includes maintaining a consistent bedtime and wake time, using your bedroom only for sleeping or sex, and keeping the bedroom dark and free of distractions like a computer, smartphone, or television.     Develop a Healthy Routine  Maintain good hygiene. Get out of bed in the morning, make your bed, brush your teeth, take a shower, and get dressed. Don t spend too much time viewing media that makes you feel stressed. Find time to relax each day.    Exercise  Get some form of exercise every day. This will help reduce pain and release endorphins, the  feel good  chemicals in your brain. It can be as simple as just going for a walk or doing some gardening, anything that will get you moving.      Diet  Strive to eat healthy foods, including fruits and vegetables. Drink plenty of water. Avoid excessive sugar, caffeine, alcohol, and other mood-altering substances.     Stay Connected with Others  Stay in touch with friends and family members.    Manage Your Mood  Try deep breathing, massage therapy, biofeedback, or meditation. Take part in fun activities when you can. Try to find something to smile about each day.     Psychotherapy  Be open to working with a therapist if your  provider recommends it.     Medication  Be sure to take your medication as prescribed. Most anti-depressants need to be taken every day. It usually takes several weeks for medications to work. Not all medicines work for all people. It is important to follow-up with your provider to make sure you have a treatment plan that is working for you. Do not stop your medication abruptly without first discussing it with your provider.    Crisis Resources   These hotlines are for both adults and children. They and are open 24 hours a day, 7 days a week unless noted otherwise.      National Suicide Prevention Lifeline   0-884-457-WDCN (4896)      Crisis Text Line    www.crisistextline.org  Text HOME to 819740 from anywhere in the [place], anytime, about any type of crisis. A live, trained crisis counselor will receive the text and respond quickly.      [employer] Lifeline for LGBTQ Youth  A national crisis intervention and suicide lifeline for LGBTQ youth under 25. Provides a safe place to talk without judgement. Call 1-952.588.1414; text START to 515702 or visit www.thetrevorproject.org to talk to a trained counselor.      For [facility] crisis numbers, visit the [facility] website at:  https://[place].gov/dhs/people-we-serve/adults/health-care/mental-health/resources/crisis-contacts.jsp

## 2021-02-03 ASSESSMENT — ANXIETY QUESTIONNAIRES: GAD7 TOTAL SCORE: 6

## 2021-02-15 ENCOUNTER — MYC MEDICAL ADVICE (OUTPATIENT)
Dept: PEDIATRICS | Facility: CLINIC | Age: 41
End: 2021-02-15

## 2021-02-15 NOTE — TELEPHONE ENCOUNTER
"Manuela Sevilla: Per up-to-date, Chantix can cause some drowsiness for some, but I don't see information about it being temporary or fading.      - Juan \"Raúl\" FRANCIA Lerner - Patient Advocate Liason (PAL)  [facility]    "

## 2021-03-28 ENCOUNTER — MYC MEDICAL ADVICE (OUTPATIENT)
Dept: PEDIATRICS | Facility: CLINIC | Age: 41
End: 2021-03-28

## 2021-06-13 ENCOUNTER — TRANSFERRED RECORDS (OUTPATIENT)
Dept: HEALTH INFORMATION MANAGEMENT | Facility: CLINIC | Age: 41
End: 2021-06-13

## 2021-06-13 LAB
ALT SERPL-CCNC: 22 U/L (ref 9–45)
AST SERPL-CCNC: 16 U/L (ref 10–40)
CREAT SERPL-MCNC: 1.17 MG/DL (ref 0.6–1.35)
GFR SERPL CREATININE-BSD FRML MDRD: 76 ML/MIN/1.73M2
GLUCOSE SERPL-MCNC: 111 MG/DL (ref 65–99)
POTASSIUM SERPL-SCNC: 3.9 MMOL/L (ref 3.5–5.3)
TSH SERPL-ACNC: 2.02 MIU/L (ref 0.4–4.5)

## 2021-06-15 ENCOUNTER — MYC MEDICAL ADVICE (OUTPATIENT)
Dept: PEDIATRICS | Facility: CLINIC | Age: 41
End: 2021-06-15

## 2021-07-01 ENCOUNTER — E-VISIT (OUTPATIENT)
Dept: URGENT CARE | Facility: URGENT CARE | Age: 41
End: 2021-07-01
Payer: COMMERCIAL

## 2021-07-01 DIAGNOSIS — L70.9 ACNE, UNSPECIFIED ACNE TYPE: Primary | ICD-10-CM

## 2021-07-01 PROCEDURE — 99421 OL DIG E/M SVC 5-10 MIN: CPT | Performed by: PHYSICIAN ASSISTANT

## 2021-07-01 RX ORDER — CLINDAMYCIN PHOSPHATE 10 UG/ML
LOTION TOPICAL 2 TIMES DAILY
Qty: 60 ML | Refills: 2 | Status: SHIPPED | OUTPATIENT
Start: 2021-07-01 | End: 2022-02-24

## 2021-09-20 ENCOUNTER — MYC MEDICAL ADVICE (OUTPATIENT)
Dept: PEDIATRICS | Facility: CLINIC | Age: 41
End: 2021-09-20

## 2021-09-20 NOTE — TELEPHONE ENCOUNTER
Patient sent mcTEL message stating that she took Chantix for a month. She recently was informed that Chantix has been linked to cancer causing agent's. Patient wonders if she should be concerned. Yareli Su RN on 9/20/2021 at 1:50 PM

## 2021-09-21 ENCOUNTER — MYC MEDICAL ADVICE (OUTPATIENT)
Dept: PEDIATRICS | Facility: CLINIC | Age: 41
End: 2021-09-21

## 2021-09-21 NOTE — TELEPHONE ENCOUNTER
Please contact their pharmacy to see if the Chantix product they received was a part of the recall. Pharmacy should have reached out to patient

## 2021-10-10 ENCOUNTER — HEALTH MAINTENANCE LETTER (OUTPATIENT)
Age: 41
End: 2021-10-10

## 2021-12-29 ENCOUNTER — MYC MEDICAL ADVICE (OUTPATIENT)
Dept: PEDIATRICS | Facility: CLINIC | Age: 41
End: 2021-12-29
Payer: COMMERCIAL

## 2021-12-30 ENCOUNTER — OFFICE VISIT (OUTPATIENT)
Dept: PEDIATRICS | Facility: CLINIC | Age: 41
End: 2021-12-30
Payer: COMMERCIAL

## 2021-12-30 ENCOUNTER — TELEPHONE (OUTPATIENT)
Dept: PEDIATRICS | Facility: CLINIC | Age: 41
End: 2021-12-30

## 2021-12-30 ENCOUNTER — ANCILLARY PROCEDURE (OUTPATIENT)
Dept: GENERAL RADIOLOGY | Facility: CLINIC | Age: 41
End: 2021-12-30
Attending: PHYSICIAN ASSISTANT
Payer: COMMERCIAL

## 2021-12-30 VITALS
RESPIRATION RATE: 16 BRPM | TEMPERATURE: 97.8 F | WEIGHT: 230 LBS | HEART RATE: 89 BPM | SYSTOLIC BLOOD PRESSURE: 106 MMHG | OXYGEN SATURATION: 99 % | BODY MASS INDEX: 32.54 KG/M2 | DIASTOLIC BLOOD PRESSURE: 78 MMHG

## 2021-12-30 DIAGNOSIS — M79.662 PAIN OF LEFT LOWER LEG: ICD-10-CM

## 2021-12-30 DIAGNOSIS — Z23 COVID-19 VACCINE REGIMEN TO MAINTAIN IMMUNITY COMPLETED: ICD-10-CM

## 2021-12-30 DIAGNOSIS — M79.662 PAIN OF LEFT LOWER LEG: Primary | ICD-10-CM

## 2021-12-30 DIAGNOSIS — Z23 INFLUENZA VACCINE ADMINISTERED: ICD-10-CM

## 2021-12-30 PROCEDURE — 73590 X-RAY EXAM OF LOWER LEG: CPT | Mod: LT | Performed by: RADIOLOGY

## 2021-12-30 PROCEDURE — 99214 OFFICE O/P EST MOD 30 MIN: CPT | Mod: 25 | Performed by: PHYSICIAN ASSISTANT

## 2021-12-30 PROCEDURE — 91300 COVID-19,PF,PFIZER (12+ YRS): CPT | Performed by: PHYSICIAN ASSISTANT

## 2021-12-30 PROCEDURE — 0004A COVID-19,PF,PFIZER (12+ YRS): CPT | Performed by: PHYSICIAN ASSISTANT

## 2021-12-30 PROCEDURE — 90686 IIV4 VACC NO PRSV 0.5 ML IM: CPT | Performed by: PHYSICIAN ASSISTANT

## 2021-12-30 PROCEDURE — 90471 IMMUNIZATION ADMIN: CPT | Performed by: PHYSICIAN ASSISTANT

## 2021-12-30 NOTE — PROGRESS NOTES
Assessment & Plan     Pain of left lower leg  Likely muscle tear. Proceed with US for further evaluation.  - XR Tibia & Fibula Left 2 Views; Future  - US Lower Extremity Non Vascular Left; Future    COVID-19 vaccine regimen to maintain immunity completed  - COVID-19,PF,PFIZER (12+ Yrs PURPLE LABEL)    Influenza vaccine administered  - INFLUENZA VACCINE IM >6 MO VALENT IIV4 (ALFURIA/FLUZONE)    KAM Gutiérrez [facility] [place] is a 41 year old who presents for the following health issues:    HPI     Pain History:  Where in your body do you have pain? Musculoskeletal problem/pain  Onset/Duration: 4 days  Description  Location: leg - left  Joint Swelling: YES  Redness: no but bruising x yesterday  Pain: YES  Warmth: no  Intensity:  moderate  Progression of Symptoms:  improving  Accompanying signs and symptoms:   Fevers: no  Numbness/tingling/weakness: no  History  Trauma to the area: YES--patient was skiing when he twisted his left lower leg.   Feels pain in the posterior leg. No knee or ankle pain.   Recent illness:  no  Previous similar problem: no  Previous evaluation:  no  Precipitating or alleviating factors:  Aggravating factors include: standing, walking and climbing stairs  Therapies tried and outcome: rest/inactivity, ice and NSAID -alleve    Review of Systems   Constitutional, HEENT, cardiovascular, pulmonary, gi and gu systems are negative, except as otherwise noted.      Objective    /78   Pulse 89   Temp 97.8  F (36.6  C) (Tympanic)   Resp 16   Wt 104.3 kg (230 lb)   SpO2 99%   BMI 32.54 kg/m    Body mass index is 32.54 kg/m .  Physical Exam   GENERAL: healthy, alert and no distress  LEG: mild edema of the left lower extremity; ecchymosis noted on the mid posterior leg and medial aspect of foot. Tender over the posterior proximal leg. Full ROM and strength.

## 2021-12-30 NOTE — TELEPHONE ENCOUNTER
Spoke with [facility]. They need to obtain the x-ray, the radiologist will review and then either approve or deny. [facility] will call the station either way. At that point, please call the pt to notify.    Jennifer Davila on 12/30/2021 at 2:56 PM

## 2021-12-30 NOTE — TELEPHONE ENCOUNTER
[facility] calling to request more information on what US is for. Says they do not do muscle tears at that location and that only the [facility] does.     Says that if wanting US for muscle tear it needs to be ordered as [facility] to let schedules know of special scheduling.    Tami Du at 1:20 PM on 12/30/2021  [facility]  Phone [phone]

## 2021-12-31 NOTE — TELEPHONE ENCOUNTER
I would like patient to see ortho to determine next steps.   Cancel all US. I did not realize this was going to be so difficult.     Daren Ann PA-C

## 2021-12-31 NOTE — TELEPHONE ENCOUNTER
Scheduled then called and spoke with pt informing of the plan to see Ortho and no Ultrasound.    Jennifer Davila on 12/31/2021 at 1:05 PM

## 2021-12-31 NOTE — TELEPHONE ENCOUNTER
Myesha from [facility] called stating their radiologists were able to review the x-rays and recommended pt to have an MRI instead of an ultrasound. Due to this, they wouldn't be able to perform an ultrasound. They may be able to get the pt in on Monday if his insurance does not need approval due to it being an out of state insurance.    Jennifer Davila on 12/31/2021 at 12:00 PM

## 2022-01-07 ENCOUNTER — OFFICE VISIT (OUTPATIENT)
Dept: ORTHOPEDICS | Facility: CLINIC | Age: 42
End: 2022-01-07
Payer: COMMERCIAL

## 2022-01-07 VITALS
SYSTOLIC BLOOD PRESSURE: 126 MMHG | BODY MASS INDEX: 32.2 KG/M2 | WEIGHT: 230 LBS | HEIGHT: 71 IN | DIASTOLIC BLOOD PRESSURE: 88 MMHG

## 2022-01-07 DIAGNOSIS — S86.812A STRAIN OF LEFT CALF MUSCLE: Primary | ICD-10-CM

## 2022-01-07 PROCEDURE — 99243 OFF/OP CNSLTJ NEW/EST LOW 30: CPT | Performed by: STUDENT IN AN ORGANIZED HEALTH CARE EDUCATION/TRAINING PROGRAM

## 2022-01-07 RX ORDER — NAPROXEN 500 MG/1
500 TABLET ORAL 2 TIMES DAILY WITH MEALS
Qty: 28 TABLET | Refills: 1 | Status: SHIPPED | OUTPATIENT
Start: 2022-01-07 | End: 2022-01-21

## 2022-01-07 ASSESSMENT — MIFFLIN-ST. JEOR: SCORE: 1952.46

## 2022-01-07 NOTE — LETTER
1/7/2022         RE: Felix Winslow  [address]  [place] [id number]        Dear Colleague,    Thank you for referring your patient, Felix Winslow, to the [facility]. Please see a copy of my visit note below.    ASSESSMENT & PLAN    1. Strain of left gastrocnemius muscle      Felix Winslow is a 41 year old adult presenting for evaluation of acute left calf pain and bruising following skiing injury 2 weeks ago (DOI 12/26/21).  History, exam and imaging findings were reviewed today, consistent with moderate gastrocnemius strain. We reviewed treatment options inclusive of pain control, activity modification, bracing, formal physical therapy and timing of advance imaging (ie MRI).   At this time, will proceed with the following plan  - Naproxen 500 mg twice daily with food for the next 7-14 days, then as needed  - Tylenol 1000 mg up to 3 times per day for pain  - Ice the calf for 10-15 minutes 3-4 times per day. You may try heat for comfort as well.   - Avoid activities that provoke pain and gradually advance activities as tolerated  - Formal physical therapy - eccentric exercise regimen including calf, achilles, and hamstring strengthening along with core strengthening including use of taping/modalities as deemed appropriate with home exercise prescription.    You may call our direct clinic number ([phone]) at any time with questions or concerns.    Mandie Bell MD, [facility]    -----    SUBJECTIVE  Felix Winslow is a/an 41 year old adult who is seen in consultation at the request of  Daren Ann PA-C for evaluation of left lower leg pain. The patient is seen by themselves.    Onset: 12/26/21 ~ 2 weeks ago. Patient describes injury as they were skiing when they hit an ice patch and tried to stop themselves with their ski poles. This caused their body to twist and they fell onto the left leg / left  side of their body. They felt a pop and immediate pain in the proximal calf musculature. They have treated this conservatively with RICE therapy and have noted interval improvement in symptoms. Now walking without a limp in the house.  Location of Pain: left proximal calf  Rating of Pain at worst: 10/10  Rating of Pain Currently: 3/10  Worsened by: prolonged walking  Better with: RICE  Treatments tried: rest/activity avoidance, elevation, ice, heat, Aleve, topical analgesics, and previous imaging (xray 12/30/21)  Associated symptoms: calf swelling and bruising (improved), limping  Orthopedic history: NO  Relevant surgical history: NO  Social history: Works as a  for [employer] - currently in the off season; enjoys working out / being active with his family    No past medical history on file.  Social History     Socioeconomic History     Marital status:      Spouse name: Not on file     Number of children: Not on file     Years of education: Not on file     Highest education level: Not on file   Occupational History     Not on file   Tobacco Use     Smoking status: Never Smoker     Smokeless tobacco: Former User   Substance and Sexual Activity     Alcohol use: Yes     Drug use: Never     Sexual activity: Yes     Partners: Female   Other Topics Concern     Not on file   Social History Narrative     Not on file     Social Determinants of Health     Financial Resource Strain: Not on file   Food Insecurity: Not on file   Transportation Needs: Not on file   Physical Activity: Not on file   Stress: Not on file   Social Connections: Not on file   Intimate Partner Violence: Not on file   Housing Stability: Not on file          Patient's past medical, surgical, social, and family histories were reviewed today and no changes are noted.    REVIEW OF SYSTEMS:  10 point ROS is negative other than symptoms noted above in HPI, Past Medical History or as stated below  Constitutional: NEGATIVE for fever, chills,  "change in weight  Skin: NEGATIVE for worrisome rashes, moles or lesions  GI/: NEGATIVE for bowel or bladder changes  Neuro: NEGATIVE for weakness, dizziness or paresthesias    OBJECTIVE:  /88   Ht 1.791 m (5' 10.5\")   Wt 104.3 kg (230 lb)   BMI 32.54 kg/m     General: healthy, alert and in no distress  HEENT: no scleral icterus or conjunctival erythema  Skin: no suspicious lesions or rash. No jaundice.  CV:  no pedal edema  Resp: normal respiratory effort without conversational dyspnea   Psych: normal mood and affect  Gait: antlagic gait with appropriate coordination and balance  Neuro: Normal light sensory exam of lower extremity  MSK:  LEFT ANKLE  Inspection:    Mild swelling and resolving bruising over the gastroc muscle bellies. Slight loss of gastroc definition compared to the contralateral limb. Normal appearing achilles tendon.   Palpation:    Tender about the medial and lateral gastroc muscle bellies. Achilles tendon is nontender without defect or swelling. Remainder of bony and ligamentous landmarks are nontender.  Range of Motion:     Plantarflexion full / dorsiflexion full / inversion full / eversion full  Strength:    Plantarflexion full (painful) / dorsiflexion full (painful stretch) / inversion full (mildly painful) / eversion full (mildly painful)    Quad and hamstring strength full  Special Tests:    negative anterior drawer, negative talar tilt, negative valgus stress, negative forced external rotation/eversion, negative Hart sign, negative squeeze test. Able to perform heel raise    Independent review of the below imaging:    Results for orders placed or performed in visit on 12/30/21   XR Tibia & Fibula Left 2 Views    Narrative    XR TIBIA & FIBULA LT 2 VW 12/30/2021 11:09 AM     HISTORY: Pain of left lower leg      Impression    IMPRESSION: Negative exam.    JERRI SUAZO MD         SYSTEM ID:  [id number]           Mandie Bell MD, [facility]        Again, thank you for allowing me to participate in the care of your patient.        Sincerely,        Mandie Bell MD

## 2022-01-07 NOTE — PROGRESS NOTES
ASSESSMENT & PLAN    1. Strain of left gastrocnemius muscle      Felix Winslow is a 41 year old adult presenting for evaluation of acute left calf pain and bruising following skiing injury 2 weeks ago (DOI 12/26/21).  History, exam and imaging findings were reviewed today, consistent with moderate gastrocnemius strain. We reviewed treatment options inclusive of pain control, activity modification, bracing, formal physical therapy and timing of advance imaging (ie MRI).   At this time, will proceed with the following plan  - Naproxen 500 mg twice daily with food for the next 7-14 days, then as needed  - Tylenol 1000 mg up to 3 times per day for pain  - Ice the calf for 10-15 minutes 3-4 times per day. You may try heat for comfort as well.   - Avoid activities that provoke pain and gradually advance activities as tolerated  - Formal physical therapy - eccentric exercise regimen including calf, achilles, and hamstring strengthening along with core strengthening including use of taping/modalities as deemed appropriate with home exercise prescription.    You may call our direct clinic number ([phone]) at any time with questions or concerns.    Mandie Bell MD, [facility]    -----    SUBJECTIVE  Felix Winslow is a/an 41 year old adult who is seen in consultation at the request of  Daren Ann PA-C for evaluation of left lower leg pain. The patient is seen by themselves.    Onset: 12/26/21 ~ 2 weeks ago. Patient describes injury as they were skiing when they hit an ice patch and tried to stop themselves with their ski poles. This caused their body to twist and they fell onto the left leg / left side of their body. They felt a pop and immediate pain in the proximal calf musculature. They have treated this conservatively with RICE therapy and have noted interval improvement in symptoms. Now walking without a limp in the house.  Location of Pain: left  "proximal calf  Rating of Pain at worst: 10/10  Rating of Pain Currently: 3/10  Worsened by: prolonged walking  Better with: RICE  Treatments tried: rest/activity avoidance, elevation, ice, heat, Aleve, topical analgesics, and previous imaging (xray 12/30/21)  Associated symptoms: calf swelling and bruising (improved), limping  Orthopedic history: NO  Relevant surgical history: NO  Social history: Works as a  for the [employer] - currently in the off season; enjoys working out / being active with his family    No past medical history on file.  Social History     Socioeconomic History     Marital status:      Spouse name: Not on file     Number of children: Not on file     Years of education: Not on file     Highest education level: Not on file   Occupational History     Not on file   Tobacco Use     Smoking status: Never Smoker     Smokeless tobacco: Former User   Substance and Sexual Activity     Alcohol use: Yes     Drug use: Never     Sexual activity: Yes     Partners: Female   Other Topics Concern     Not on file   Social History Narrative     Not on file     Social Determinants of Health     Financial Resource Strain: Not on file   Food Insecurity: Not on file   Transportation Needs: Not on file   Physical Activity: Not on file   Stress: Not on file   Social Connections: Not on file   Intimate Partner Violence: Not on file   Housing Stability: Not on file          Patient's past medical, surgical, social, and family histories were reviewed today and no changes are noted.    REVIEW OF SYSTEMS:  10 point ROS is negative other than symptoms noted above in HPI, Past Medical History or as stated below  Constitutional: NEGATIVE for fever, chills, change in weight  Skin: NEGATIVE for worrisome rashes, moles or lesions  GI/: NEGATIVE for bowel or bladder changes  Neuro: NEGATIVE for weakness, dizziness or paresthesias    OBJECTIVE:  /88   Ht 1.791 m (5' 10.5\")   Wt 104.3 kg (230 lb)   BMI " 32.54 kg/m     General: healthy, alert and in no distress  HEENT: no scleral icterus or conjunctival erythema  Skin: no suspicious lesions or rash. No jaundice.  CV:  no pedal edema  Resp: normal respiratory effort without conversational dyspnea   Psych: normal mood and affect  Gait: antlagic gait with appropriate coordination and balance  Neuro: Normal light sensory exam of lower extremity  MSK:  LEFT ANKLE  Inspection:    Mild swelling and resolving bruising over the gastroc muscle bellies. Slight loss of gastroc definition compared to the contralateral limb. Normal appearing achilles tendon.   Palpation:    Tender about the medial and lateral gastroc muscle bellies. Achilles tendon is nontender without defect or swelling. Remainder of bony and ligamentous landmarks are nontender.  Range of Motion:     Plantarflexion full / dorsiflexion full / inversion full / eversion full  Strength:    Plantarflexion full (painful) / dorsiflexion full (painful stretch) / inversion full (mildly painful) / eversion full (mildly painful)    Quad and hamstring strength full  Special Tests:    negative anterior drawer, negative talar tilt, negative valgus stress, negative forced external rotation/eversion, negative Hart sign, negative squeeze test. Able to perform heel raise    Independent review of the below imaging:    Results for orders placed or performed in visit on 12/30/21   XR Tibia & Fibula Left 2 Views    Narrative    XR TIBIA & FIBULA LT 2 VW 12/30/2021 11:09 AM     HISTORY: Pain of left lower leg      Impression    IMPRESSION: Negative exam.    JERRI SUAZO MD         SYSTEM ID:  [id number]           Mandie Bell MD, [facility]

## 2022-01-07 NOTE — PATIENT INSTRUCTIONS
1. Strain of left calf muscle      Felix Winslow is a 41 year old adult presenting for evaluation of acute left calf pain and bruising following skiing injury 2 weeks ago (DOI 12/26/21).  History, exam and imaging findings were reviewed today, consistent with strain vs possible partial tear of the gastrocnemius. We reviewed treatment options inclusive of pain control, activity modification, bracing, formal physical therapy and timing of advance imaging (ie MRI).   At this time, will proceed with the following plan  - Naproxen 500 mg twice daily with food for the next 7-14 days, then as needed  - Tylenol 1000 mg up to 3 times per day for pain  - Ice the calf for 10-15 minutes 3-4 times per day. You may try heat for comfort as well.   - Avoid activities that provoke pain and gradually advance activities as tolerated  - Formal physical therapy - eccentric exercise regimen including calf, achilles, and hamstring strengthening along with core strengthening including use of taping/modalities as deemed appropriate with home exercise prescription.    You may call our direct clinic number ([phone]) at any time with questions or concerns.    Mandie Bell MD, [facility]    WHAT IS A CALF STRAIN?      A calf strain is a stretch or tear of a muscle or tendon in the back of your leg below your knee. This area of your leg is called the calf. Tendons are strong bands of tissue that attach muscle to bone.    This type of injury is often called a pulled muscle.    WHAT IS THE CAUSE?    You can strain your calf muscle when you do an activity that involves pushing off forcefully from your toes. For example, it may happen when you are running, jumping, or lunging.    WHAT ARE THE SYMPTOMS?    Symptoms may include:    A snapping or popping sound at the time of the injury  Pain or burning in the back of your lower leg  A feeling that someone has hit you in the back of the  leg  Trouble rising up on your toes  Swelling and bruising    HOW IS IT DIAGNOSED?    Your healthcare provider will ask about your symptoms, activities, and medical history and examine your leg.    HOW IS IT TREATED?    You will need to change or stop doing the activities that cause pain until your muscle or tendon has healed. For example, you may need to swim instead of run. You may need to use crutches if it s too painful to walk.    Your healthcare provider may recommend stretching and strengthening exercises. Your provider may refer you to physical therapy.    Your healthcare provider or physical therapist may tape the injured muscle while it heals to help you return to athletic activities. Taping helps reduce movement that may cause more muscle damage. They may recommend wearing an elastic or neoprene sleeve around your calf.    A mild strain may heal within a few weeks. A more severe strain may take 6 weeks or longer to heal.    HOW CAN I TAKE CARE OF MYSELF?    To help relieve swelling and pain:    Put an ice pack, gel pack, or package of frozen vegetables wrapped in a cloth on the sore area every 3 to 4 hours for up to 20 minutes at a time.  Do ice massage. To do this, freeze water in a Styrofoam cup, then peel the top of the cup away to expose the ice. Hold the bottom of the cup and rub the ice over the painful area for 5 to 10 minutes. Do this several times a day while you have pain.  Keep your leg up on pillows when you sit or lie down.  Take nonprescription pain medicine, such as acetaminophen, ibuprofen, or naproxen. Read the label and take as directed. Nonsteroidal anti-inflammatory medicines (NSAIDs), such as ibuprofen or naproxen, may cause stomach bleeding and other problems. These risks increase with age. Unless recommended by your healthcare provider, do not take an NSAID for more than 10 days.  After you recover from your injury, moist heat may help relax your muscles and make it easier to use  them. Put moist heat on your calf for 10 to 15 minutes before you do warm-up and stretching exercises. Moist heat includes heat patches or moist heating pads that you can buy at most drugstores, a wet washcloth or towel that has been heated in a microwave or the dryer, or a hot shower. Don t use heat if you have swelling.    Use an elastic bandage when you return to your activities as directed by your provider.    Follow your healthcare provider's instructions, including any exercises recommended by your provider. Ask your provider:    How long it will take to recover  What activities you should avoid and when you can return to your normal activities  How to take care of yourself at home  What symptoms or problems you should watch for and what to do if you have them  Make sure you know when you should come back for a checkup.    HOW CAN I HELP PREVENT A CALF STRAIN?    Warm-up exercises and stretching before activities can help prevent injuries. This is especially important if you are doing jumping or sprinting sports.    Developed by [employer].  Published by [employer].  Copyright  2014 [employer] and/or one of its subsidiaries. All rights reserved.      CALF STRAIN EXERCISES   You can start gently stretching your calf muscle with the towel stretch right away. Make sure you feel only a gentle pull and not a sharp pain in your calf while you are doing the stretch.    Wearing a quarter to half-inch heel lift in each shoe might reduce your pain as you start to recover from your injury. You can purchase heel lifts at most pharmacies. You can stop using the heel lift when you have no pain while walking.    Towel stretch: Sit on a hard surface with your injured leg stretched out in front of you. Loop a towel around your toes and the ball of your foot and pull the towel toward your body keeping your leg straight. Hold this position for 15 to 30 seconds and then relax. Repeat 3 times.  After you can do the  towel stretch easily, you can start the standing calf stretch.    Standing calf stretch: Stand facing a wall with your hands on the wall at about eye level. Keep your injured leg back with your heel on the floor. Keep the other leg forward with the knee bent. Turn your back foot slightly inward (as if you were pigeon-toed). Slowly lean into the wall until you feel a stretch in the back of your calf. Hold the stretch for 15 to 30 seconds. Return to the starting position. Repeat 3 times. Do this exercise several times each day.    After a couple days of stretching (pain free walking and pain free stretching), you can begin strengthening your calf and lower leg muscles using elastic tubing as described in the next exercise.    Resisted ankle plantar flexion: Sit with your injured leg stretched out in front of you. Loop the tubing around the ball of your foot. Hold the ends of the tubing with both hands. Gently press the ball of your foot down and point your toes, stretching the tubing. Return to the starting position. Do 2 sets of 15.  You may do the last 4 exercises when you can stand on your toes without pain.    Heel raise: Stand behind a chair or counter with both feet flat on the floor. Using the chair or counter as a support, rise up onto your toes and hold for 5 seconds. Then slowly lower yourself down without holding onto the support. (It's OK to keep holding onto the support if you need to.) When this exercise becomes less painful, try doing this exercise while you are standing on the injured leg only. Repeat 15 times. Do 2 sets of 15. Rest 30 seconds between sets.  You can challenge yourself by standing on just your injured leg as you do this exercise.    Single leg balance: Stand without any support and try to balance on your injured leg. Keep standing on the one leg for 30 seconds. Repeat 3 times. Begin with your eyes open and then try to do the exercise with your eyes closed. When you have mastered this,  "try doing the exercise standing on a pillow.    Nose touch: Stand on one leg facing a wall. Stand 4 inches (10 centimeters) from the wall. Keep your body and leg straight. Slowly lean forward, trying to touch your nose to the wall and then return to the starting position. Make sure you do not bend forward at your waist. Do 2 sets of 10.    Wall jump: Face a wall and place a piece of masking tape about 2 feet (50 to 60 centimeters) above your head. Jump up with your arms above your head and try to touch the piece of tape. Make sure you do a \"spring\" type of motion and try to land softly on your feet. As the exercise gets easier, jump on just your injured leg. Do 2 sets of 15.    Side-lying leg lift: Lie on your uninjured side. Tighten the front thigh muscles on your injured leg and lift that leg 8 to 10 inches (20 to 25 centimeters) away from the other leg. Keep the leg straight and lower it slowly. Do 2 sets of 15.    When you can do these exercises without pain, you can start a light jogging program. You can return to your sport when there is no difference between your injured side and noninjured side when you do the exercises.    Developed by [employer].  Published by [employer].  Copyright  2014 [employer] and/or one of its subsidiaries. All rights reserved.        "

## 2022-01-12 ENCOUNTER — THERAPY VISIT (OUTPATIENT)
Dept: PHYSICAL THERAPY | Facility: CLINIC | Age: 42
End: 2022-01-12
Attending: STUDENT IN AN ORGANIZED HEALTH CARE EDUCATION/TRAINING PROGRAM
Payer: COMMERCIAL

## 2022-01-12 DIAGNOSIS — M79.662 PAIN OF LEFT LOWER LEG: Primary | ICD-10-CM

## 2022-01-12 DIAGNOSIS — S86.812A STRAIN OF LEFT CALF MUSCLE: ICD-10-CM

## 2022-01-12 PROCEDURE — 97140 MANUAL THERAPY 1/> REGIONS: CPT | Mod: GP | Performed by: PHYSICAL THERAPIST

## 2022-01-12 PROCEDURE — 97161 PT EVAL LOW COMPLEX 20 MIN: CPT | Mod: GP | Performed by: PHYSICAL THERAPIST

## 2022-01-12 PROCEDURE — 97110 THERAPEUTIC EXERCISES: CPT | Mod: GP | Performed by: PHYSICAL THERAPIST

## 2022-01-12 ASSESSMENT — ACTIVITIES OF DAILY LIVING (ADL)
WEAKNESS: I DO NOT HAVE THE SYMPTOM
GO UP STAIRS: ACTIVITY IS MINIMALLY DIFFICULT
KNEEL ON THE FRONT OF YOUR KNEE: ACTIVITY IS NOT DIFFICULT
KNEE_ACTIVITY_OF_DAILY_LIVING_SUM: 53
WALK: ACTIVITY IS MINIMALLY DIFFICULT
SIT WITH YOUR KNEE BENT: ACTIVITY IS NOT DIFFICULT
SWELLING: I HAVE THE SYMPTOM BUT IT DOES NOT AFFECT MY ACTIVITY
PAIN: THE SYMPTOM AFFECTS MY ACTIVITY MODERATELY
GO DOWN STAIRS: ACTIVITY IS MINIMALLY DIFFICULT
RISE FROM A CHAIR: ACTIVITY IS NOT DIFFICULT
KNEE_ACTIVITY_OF_DAILY_LIVING_SCORE: 75.71
SQUAT: ACTIVITY IS MINIMALLY DIFFICULT
HOW_WOULD_YOU_RATE_THE_OVERALL_FUNCTION_OF_YOUR_KNEE_DURING_YOUR_USUAL_DAILY_ACTIVITIES?: NEARLY NORMAL
STAND: ACTIVITY IS MINIMALLY DIFFICULT
LIMPING: THE SYMPTOM AFFECTS MY ACTIVITY MODERATELY
STIFFNESS: THE SYMPTOM AFFECTS MY ACTIVITY SLIGHTLY
GIVING WAY, BUCKLING OR SHIFTING OF KNEE: THE SYMPTOM AFFECTS MY ACTIVITY MODERATELY
HOW_WOULD_YOU_RATE_THE_CURRENT_FUNCTION_OF_YOUR_KNEE_DURING_YOUR_USUAL_DAILY_ACTIVITIES_ON_A_SCALE_FROM_0_TO_100_WITH_100_BEING_YOUR_LEVEL_OF_KNEE_FUNCTION_PRIOR_TO_YOUR_INJURY_AND_0_BEING_THE_INABILITY_TO_PERFORM_ANY_OF_YOUR_USUAL_DAILY_ACTIVITIES?: 80
AS_A_RESULT_OF_YOUR_KNEE_INJURY,_HOW_WOULD_YOU_RATE_YOUR_CURRENT_LEVEL_OF_DAILY_ACTIVITY?: ABNORMAL
RAW_SCORE: 53

## 2022-01-12 NOTE — PROGRESS NOTES
Physical Therapy Initial Evaluation  Subjective:  The history is provided by the patient. No  was used.   Patient Health History  Felix Winslow being seen for Left lower leg injury .     Problem began: 12/26/2021.   Problem occurred: Skiing   Pain is reported as 3/10 on pain scale.  General health as reported by patient is good.  Pertinent medical history includes: none.   Red flags:  None as reported by patient.  Medical allergies: none.   Surgeries include:  None.    Current medications:  None.    Current occupation is Baseball .   Primary job tasks include:  Prolonged sitting.                  Therapist Generated HPI Evaluation  Problem details: Pain started up right below the knee after the fall skiing but has since worked its way down the leg. States he did have some bruising on the medial ankle a few days after the injury. .         Affected Side: left calf lower leg     This is a new condition.  Condition occurred with:  A fall/slip.  Where condition occurred: during recreation/sport.  Patient reports pain:  Lower leg.  Pain is described as sharp and is intermittent.    Since onset symptoms are gradually improving.  Associated symptoms:  Loss of strength. Symptoms are exacerbated by descending stairs, running and walking  and relieved by NSAID's and rest.      Restrictions due to condition include:  Working in normal job without restrictions.  Barriers include:  None as reported by patient.                        Objective:    Gait:    Gait Type:  Antalgic     Deviations:  Ankle:  Pronation incr L and push off decr L    Flexibility/Screens:       Lower Extremity:  Decreased left lower extremity flexibility:Soleus            Ankle/Foot Evaluation  ROM:    AROM:    Dorsiflexion:  Left:   8  Right:   8  Plantarflexion:  Left:  68    Right:  65            Strength:    Dorsiflexion:  Left: 5/5     Pain:   Right: 5/5   Pain:  Plantarflexion: Left: 4+/5    Pain:+   Right: 5/5   Pain:  Inversion:Left: 5/5  Pain:     Right: 5/5  Pain:  Eversion:Left: 5/5  Pain:  Right: 5/5  Pain:                  LIGAMENT TESTING: normal              SPECIAL TESTS:     Left ankle negative for the following special tests:  Hart sign    PALPATION:   Left ankle tenderness present at:  gastroc/soleus and achilles tendon    EDEMA: normal          MOBILITY TESTING: normal                                                                General     ROS    Assessment/Plan:    Patient is a 41 year old adult with left lower leg complaints.    Patient has the following significant findings with corresponding treatment plan.                Diagnosis 1:  Left lower leg pain  Pain -  hot/cold therapy, US, electric stimulation, manual therapy, self management, education, home program and FDN  Decreased ROM/flexibility - manual therapy, therapeutic exercise, therapeutic activity and home program  Decreased strength - therapeutic exercise, therapeutic activities and home program  Impaired gait - gait training and home program  Impaired muscle performance - neuro re-education and home program  Decreased function - therapeutic activities and home program    Therapy Evaluation Codes:   Cumulative Therapy Evaluation is: Low complexity.    Previous and current functional limitations:  (See Goal Flow Sheet for this information)    Short term and Long term goals: (See Goal Flow Sheet for this information)     Communication ability:  Patient appears to be able to clearly communicate and understand verbal and written communication and follow directions correctly.  Treatment Explanation - The following has been discussed with the patient:   RX ordered/plan of care  Anticipated outcomes  Possible risks and side effects  This patient would benefit from PT intervention to resume normal activities.   Rehab potential is good.    Frequency:  1 X week, once daily  Duration:  for 6 weeks  Discharge Plan:  Achieve all LTG.  Independent in  home treatment program.  Reach maximal therapeutic benefit.    Please refer to the daily flowsheet for treatment today, total treatment time and time spent performing 1:1 timed codes.

## 2022-01-19 ENCOUNTER — THERAPY VISIT (OUTPATIENT)
Dept: PHYSICAL THERAPY | Facility: CLINIC | Age: 42
End: 2022-01-19
Payer: COMMERCIAL

## 2022-01-19 DIAGNOSIS — M79.662 PAIN OF LEFT LOWER LEG: ICD-10-CM

## 2022-01-19 PROCEDURE — 97110 THERAPEUTIC EXERCISES: CPT | Mod: GP | Performed by: PHYSICAL THERAPIST

## 2022-01-19 PROCEDURE — 97140 MANUAL THERAPY 1/> REGIONS: CPT | Mod: GP | Performed by: PHYSICAL THERAPIST

## 2022-01-19 PROCEDURE — 20560 NDL INSJ W/O NJX 1 OR 2 MUSC: CPT | Performed by: PHYSICAL THERAPIST

## 2022-01-19 NOTE — PROGRESS NOTES
TISSUE NEEDLE LENGTH TIME POSITION TECHNIQUE   L gastrocnemius 60 mm  8 min  prone pistoning                      There were no adverse reactions to dry needling treatment

## 2022-01-26 ENCOUNTER — THERAPY VISIT (OUTPATIENT)
Dept: PHYSICAL THERAPY | Facility: CLINIC | Age: 42
End: 2022-01-26
Payer: COMMERCIAL

## 2022-01-26 DIAGNOSIS — M79.662 PAIN OF LEFT LOWER LEG: ICD-10-CM

## 2022-01-26 PROCEDURE — 20560 NDL INSJ W/O NJX 1 OR 2 MUSC: CPT | Performed by: PHYSICAL THERAPIST

## 2022-01-26 PROCEDURE — 97110 THERAPEUTIC EXERCISES: CPT | Mod: GP | Performed by: PHYSICAL THERAPIST

## 2022-01-26 PROCEDURE — 97140 MANUAL THERAPY 1/> REGIONS: CPT | Mod: GP | Performed by: PHYSICAL THERAPIST

## 2022-01-26 NOTE — PROGRESS NOTES
TISSUE NEEDLE LENGTH TIME POSITION TECHNIQUE   L gastrocnemius 60 mm  8 min  prone pistoning

## 2022-02-02 ENCOUNTER — THERAPY VISIT (OUTPATIENT)
Dept: PHYSICAL THERAPY | Facility: CLINIC | Age: 42
End: 2022-02-02
Payer: COMMERCIAL

## 2022-02-02 DIAGNOSIS — M79.662 PAIN OF LEFT LOWER LEG: ICD-10-CM

## 2022-02-02 PROCEDURE — 97140 MANUAL THERAPY 1/> REGIONS: CPT | Mod: GP | Performed by: PHYSICAL THERAPIST

## 2022-02-02 PROCEDURE — 20560 NDL INSJ W/O NJX 1 OR 2 MUSC: CPT | Performed by: PHYSICAL THERAPIST

## 2022-02-02 NOTE — PROGRESS NOTES
TISSUE NEEDLE LENGTH TIME POSITION TECHNIQUE   L gastrocnemius 60 mm  8 min  prone pistoning

## 2022-02-16 ENCOUNTER — THERAPY VISIT (OUTPATIENT)
Dept: PHYSICAL THERAPY | Facility: CLINIC | Age: 42
End: 2022-02-16
Payer: COMMERCIAL

## 2022-02-16 DIAGNOSIS — M79.662 PAIN OF LEFT LOWER LEG: ICD-10-CM

## 2022-02-16 PROCEDURE — 97110 THERAPEUTIC EXERCISES: CPT | Mod: GP | Performed by: PHYSICAL THERAPIST

## 2022-02-16 PROCEDURE — 97140 MANUAL THERAPY 1/> REGIONS: CPT | Mod: GP | Performed by: PHYSICAL THERAPIST

## 2022-02-16 PROCEDURE — 20560 NDL INSJ W/O NJX 1 OR 2 MUSC: CPT | Performed by: PHYSICAL THERAPIST

## 2022-02-16 NOTE — PROGRESS NOTES
TISSUE NEEDLE LENGTH TIME POSITION TECHNIQUE   L gastrocnemius 60 mm  8 min  prone pistoning

## 2022-02-24 ENCOUNTER — MYC MEDICAL ADVICE (OUTPATIENT)
Dept: PEDIATRICS | Facility: CLINIC | Age: 42
End: 2022-02-24
Payer: COMMERCIAL

## 2022-02-24 DIAGNOSIS — L70.9 ACNE, UNSPECIFIED ACNE TYPE: ICD-10-CM

## 2022-02-24 RX ORDER — CLINDAMYCIN PHOSPHATE 10 UG/ML
LOTION TOPICAL 2 TIMES DAILY
Qty: 60 ML | Refills: 0 | Status: SHIPPED | OUTPATIENT
Start: 2022-02-24 | End: 2023-02-14

## 2022-03-04 ENCOUNTER — THERAPY VISIT (OUTPATIENT)
Dept: PHYSICAL THERAPY | Facility: CLINIC | Age: 42
End: 2022-03-04
Payer: COMMERCIAL

## 2022-03-04 DIAGNOSIS — M79.662 PAIN OF LEFT LOWER LEG: ICD-10-CM

## 2022-03-04 PROCEDURE — 97110 THERAPEUTIC EXERCISES: CPT | Mod: GP | Performed by: PHYSICAL THERAPIST

## 2022-03-04 PROCEDURE — 97140 MANUAL THERAPY 1/> REGIONS: CPT | Mod: GP | Performed by: PHYSICAL THERAPIST

## 2022-03-04 PROCEDURE — 20560 NDL INSJ W/O NJX 1 OR 2 MUSC: CPT | Performed by: PHYSICAL THERAPIST

## 2022-03-04 NOTE — PROGRESS NOTES
TISSUE NEEDLE LENGTH TIME POSITION TECHNIQUE   L gastrocnemius 60 mm  10 min  prone pistoning

## 2022-03-09 ENCOUNTER — OFFICE VISIT (OUTPATIENT)
Dept: ORTHOPEDICS | Facility: CLINIC | Age: 42
End: 2022-03-09
Payer: COMMERCIAL

## 2022-03-09 VITALS
DIASTOLIC BLOOD PRESSURE: 84 MMHG | BODY MASS INDEX: 32.2 KG/M2 | WEIGHT: 230 LBS | SYSTOLIC BLOOD PRESSURE: 124 MMHG | HEIGHT: 71 IN

## 2022-03-09 DIAGNOSIS — S86.812A STRAIN OF LEFT CALF MUSCLE: ICD-10-CM

## 2022-03-09 DIAGNOSIS — M67.88 ACHILLES TENDINOSIS OF LEFT LOWER EXTREMITY: Primary | ICD-10-CM

## 2022-03-09 PROCEDURE — 99214 OFFICE O/P EST MOD 30 MIN: CPT | Performed by: STUDENT IN AN ORGANIZED HEALTH CARE EDUCATION/TRAINING PROGRAM

## 2022-03-09 RX ORDER — NITROGLYCERIN 0.1MG/HR
1 PATCH, TRANSDERMAL 24 HOURS TRANSDERMAL DAILY
Qty: 30 PATCH | Refills: 3 | Status: SHIPPED | OUTPATIENT
Start: 2022-03-09 | End: 2022-06-01

## 2022-03-09 NOTE — LETTER
3/9/2022         RE: Felix Winslow  [address]  [place] [facility] [address]        Dear Colleague,    Thank you for referring your patient, Felix Winslow, to the [facility] [place]. Please see a copy of my visit note below.    ASSESSMENT & PLAN    1. Achilles tendinosis of left lower extremity    2. Strain of left calf muscle      Felix Winslow is a 41 year old adult presenting for follow up of acute left calf pain and bruising following skiing injury 10.5 weeks ago (DOI 12/26/21). Since his last visit, he has completed 6 weeks of formal physical therapy with consistent home exercises and a course of NSAIDS with no improvement in symptoms. Examination today remains consistent with gastroc strain in addition to thickening and pain over the achilles tendon most pronounced at the myotendinous junction and extending distally to the achilles insertion. Overall concerning for more significant tear of the gastroc and involvement of the achilles tendon. Given lack of improvement with 6 weeks of conservative management, recommend proceeding with MRI of the left lower extremity for further evaluation of achilles and gastroc-soleus complex injury.   - Your left lower leg MRI has been ordered. You may call [phone] to schedule with [facility].   - Also recommend starting nitroglycerin patches. Apply 1/4 patch to the thickened area of the achilles tendon and replace every 24 hours.   - Activity as tolerated based on pain. Gradually advance activities as tolerated based on pain.  - Continue PT and consistent home exercise program with emphasis on eccentric strengthening.     Follow up after your MRI to discuss results (in person or telephone). You may call our direct clinic number ([phone]) at any time with questions or concerns.    Mandei Bell MD, [facility]    -----    SUBJECTIVE:  Felix Winslow is a 41 year old adult who is seen in  "follow-up for left lower leg pain due to strain of left gastrocnemius muscle. They were last seen 1/7/2022.     Since their last visit reports pain / discomfort has not improved but has changed slightly. He continues to have intermittent pain in the gastric muscle belly, but is experiencing more consistent pain further down the calf and into the achilles tendon. He notes pain / discomfort is worse after activity, significantly limiting his activities. They indicate that their current pain level is 3/10. They have tried rest/activity avoidance, other medications: naproxen, icy hot, heat, home exercises and physical therapy (6 visits), dry needling and massage. Of note, he may be returning to [place] soon pending the end of the [facility] lockout.    The patient is seen by themselves.    Patient's past medical, surgical, social, and family histories were reviewed today and no changes are noted.    REVIEW OF SYSTEMS:  Constitutional: NEGATIVE for fever, chills, change in weight  Skin: NEGATIVE for worrisome rashes, moles or lesions  GI/: NEGATIVE for bowel or bladder changes  Neuro: NEGATIVE for weakness, dizziness or paresthesias    OBJECTIVE:  /84   Ht 1.791 m (5' 10.5\")   Wt 104.3 kg (230 lb)   BMI 32.54 kg/m     General: healthy, alert and in no distress  HEENT: no scleral icterus or conjunctival erythema  Skin: no suspicious lesions or rash. No jaundice.  CV: regular rhythm by palpation, no pedal edema  Resp: normal respiratory effort without conversational dyspnea   Psych: normal mood and affect  Gait: normal steady gait with appropriate coordination and balance  Neuro: normal light touch sensory exam of the extremities.    MSK:  LEFT ANKLE  Inspection:    Thickening and tightness of the left achilles tendon, most pronounced at the myotendinous junction but involving the entire achilles tendon. Mild swelling over the gastroc muscle bellies. Slight loss of gastroc definition compared to the contralateral limb. "   Palpation:    Tender about the medial and lateral gastroc muscle bellies, achilles tendon. Painful scar tissue/thickening at the myotendinous junction otherwise without obvious defect. Remainder of bony and ligamentous landmarks are nontender.  Range of Motion:     Plantarflexion full / dorsiflexion full / inversion full / eversion full  Strength:    Plantarflexion full (painful) / dorsiflexion full (painful stretch) / inversion full / eversion full    Quad and hamstring strength full  Special Tests:    negative anterior drawer, negative talar tilt, negative valgus stress, negative forced external rotation/eversion, negative Hart sign, negative squeeze test. Able to perform heel raise.     Independent review of the below imaging:         Results for orders placed or performed in visit on 12/30/21   XR Tibia & Fibula Left 2 Views     Narrative     XR TIBIA & FIBULA LT 2 VW 12/30/2021 11:09 AM      HISTORY: Pain of left lower leg        Impression     IMPRESSION: Negative exam.     MD Mandie CAMPBELL MD, [facility]              Again, thank you for allowing me to participate in the care of your patient.        Sincerely,        Mandie Bell MD

## 2022-03-09 NOTE — PROGRESS NOTES
Subjective:  HPI  Physical Exam                    Objective:  System    Physical Exam    General     ROS    Assessment/Plan:    PROGRESS  REPORT    Progress reporting period is from 1/12/22 to 3/4/22.       SUBJECTIVE  Subjective changes noted by patient: Patient has had less pain in his calf but still having pain in his achilles. He feels better when he is less active but any time he increases his activity level the pain increases.   Changes in function:  Yes (See Goal flowsheet attached for changes in current functional level)  Adverse reaction to treatment or activity: activity - increased walking, or any increase in activity while on his feet     OBJECTIVE  Less tension in gastrocnemius and soleus, but increased tenderness in achilles. Weakness in L gastrocnemius.       ASSESSMENT/PLAN  Updated problem list and treatment plan: Diagnosis 1:  Left lower leg pain   Pain -  hot/cold therapy, US, manual therapy, self management, education, home program and FDN  Decreased ROM/flexibility - manual therapy, therapeutic exercise, therapeutic activity and home program  Decreased strength - therapeutic exercise, therapeutic activities and home program  Decreased function - therapeutic activities and home program  STG/LTGs have been met or progress has been made towards goals:  Yes (See Goal flow sheet completed today.)  Assessment of Progress: The patient's progress has plateaued.  Self Management Plans:  Patient has been instructed in a home treatment program.  Patient  has been instructed in self management of symptoms.  I have re-evaluated this patient and find that the nature, scope, duration and intensity of the therapy is appropriate for the medical condition of the patient.  Felix continues to require the following intervention to meet STG and LTG's:  Return back to MD for further examination.     Recommendations:  This patient would benefit from further evaluation.    Please refer to the daily flowsheet for  treatment today, total treatment time and time spent performing 1:1 timed codes.

## 2022-03-09 NOTE — PATIENT INSTRUCTIONS
1. Achilles tendinosis of left lower extremity    2. Strain of left calf muscle      Felix Winslow is a 41 year old adult presenting for follow up of acute left calf pain and bruising following skiing injury 10.5 weeks ago (DOI 12/26/21). Since his last visit, he has completed 6 weeks of formal physical therapy with consistent home exercises and a course of NSAIDS with no improvement in symptoms. Examination today remains consistent with gastroc strain in addition to thickening and pain over the achilles tendon most pronounced at the myotendinous junction and extending distally to the achilles insertion. Overall concerning for more significant tear of the gastroc and involvement of the achilles tendon. Given lack of improvement with 6 weeks of conservative management, recommend proceeding with MRI of the left lower extremity for further evaluation of achilles and gastroc-soleus complex injury.   - Your left lower leg MRI has been ordered. You may call [phone] to schedule with [facility].   - Also recommend starting nitroglycerin patches. Apply 1/4 patch to the thickened area of the achilles tendon and replace every 24 hours.   - Activity as tolerated based on pain. Gradually advance activities as tolerated based on pain.  - Continue PT and consistent home exercise program with emphasis on eccentric strengthening.     Follow up after your MRI to discuss results (in person or telephone). You may call our direct clinic number ([phone]) at any time with questions or concerns.    Mandie Bell MD, [facility]

## 2022-03-09 NOTE — PROGRESS NOTES
ASSESSMENT & PLAN    1. Achilles tendinosis of left lower extremity    2. Strain of left calf muscle      Felix Winslow is a 41 year old adult presenting for follow up of acute left calf pain and bruising following skiing injury 10.5 weeks ago (DOI 12/26/21). Since his last visit, he has completed 6 weeks of formal physical therapy with consistent home exercises and a course of NSAIDS with no improvement in symptoms. Examination today remains consistent with gastroc strain in addition to thickening and pain over the achilles tendon most pronounced at the myotendinous junction and extending distally to the achilles insertion. Overall concerning for more significant tear of the gastroc and involvement of the achilles tendon. Given lack of improvement with 6 weeks of conservative management, recommend proceeding with MRI of the left lower extremity for further evaluation of achilles and gastroc-soleus complex injury.   - Your left lower leg MRI has been ordered. You may call [phone] to schedule with [facility].   - Also recommend starting nitroglycerin patches. Apply 1/4 patch to the thickened area of the achilles tendon and replace every 24 hours.   - Activity as tolerated based on pain. Gradually advance activities as tolerated based on pain.  - Continue PT and consistent home exercise program with emphasis on eccentric strengthening.     Follow up after your MRI to discuss results (in person or telephone). You may call our direct clinic number ([phone]) at any time with questions or concerns.    Mandie Bell MD, [facility]    -----    SUBJECTIVE:  Felix Winslow is a 41 year old adult who is seen in follow-up for left lower leg pain due to strain of left gastrocnemius muscle. They were last seen 1/7/2022.     Since their last visit reports pain / discomfort has not improved but has changed slightly. He continues to have intermittent pain in the gastric muscle  "belly, but is experiencing more consistent pain further down the calf and into the achilles tendon. He notes pain / discomfort is worse after activity, significantly limiting his activities. They indicate that their current pain level is 3/10. They have tried rest/activity avoidance, other medications: naproxen, icy hot, heat, home exercises and physical therapy (6 visits), dry needling and massage. Of note, he may be returning to [place] soon pending the end of the [employer] lockout.    The patient is seen by themselves.    Patient's past medical, surgical, social, and family histories were reviewed today and no changes are noted.    REVIEW OF SYSTEMS:  Constitutional: NEGATIVE for fever, chills, change in weight  Skin: NEGATIVE for worrisome rashes, moles or lesions  GI/: NEGATIVE for bowel or bladder changes  Neuro: NEGATIVE for weakness, dizziness or paresthesias    OBJECTIVE:  /84   Ht 1.791 m (5' 10.5\")   Wt 104.3 kg (230 lb)   BMI 32.54 kg/m     General: healthy, alert and in no distress  HEENT: no scleral icterus or conjunctival erythema  Skin: no suspicious lesions or rash. No jaundice.  CV: regular rhythm by palpation, no pedal edema  Resp: normal respiratory effort without conversational dyspnea   Psych: normal mood and affect  Gait: normal steady gait with appropriate coordination and balance  Neuro: normal light touch sensory exam of the extremities.    MSK:  LEFT ANKLE  Inspection:    Thickening and tightness of the left achilles tendon, most pronounced at the myotendinous junction but involving the entire achilles tendon. Mild swelling over the gastroc muscle bellies. Slight loss of gastroc definition compared to the contralateral limb.   Palpation:    Tender about the medial and lateral gastroc muscle bellies, achilles tendon. Painful scar tissue/thickening at the myotendinous junction otherwise without obvious defect. Remainder of bony and ligamentous landmarks are nontender.  Range of Motion: "     Plantarflexion full / dorsiflexion full / inversion full / eversion full  Strength:    Plantarflexion full (painful) / dorsiflexion full (painful stretch) / inversion full / eversion full    Quad and hamstring strength full  Special Tests:    negative anterior drawer, negative talar tilt, negative valgus stress, negative forced external rotation/eversion, negative Hart sign, negative squeeze test. Able to perform heel raise.     Independent review of the below imaging:         Results for orders placed or performed in visit on 12/30/21   XR Tibia & Fibula Left 2 Views     Narrative     XR TIBIA & FIBULA LT 2 VW 12/30/2021 11:09 AM      HISTORY: Pain of left lower leg        Impression     IMPRESSION: Negative exam.     MD Mandie CAMPBELL MD, CAM  [facility]

## 2022-03-12 ENCOUNTER — HOSPITAL ENCOUNTER (OUTPATIENT)
Dept: MRI IMAGING | Facility: CLINIC | Age: 42
Discharge: HOME | End: 2022-03-12
Attending: STUDENT IN AN ORGANIZED HEALTH CARE EDUCATION/TRAINING PROGRAM | Admitting: STUDENT IN AN ORGANIZED HEALTH CARE EDUCATION/TRAINING PROGRAM
Payer: COMMERCIAL

## 2022-03-12 DIAGNOSIS — M67.88 ACHILLES TENDINOSIS OF LEFT LOWER EXTREMITY: ICD-10-CM

## 2022-03-12 DIAGNOSIS — S86.812A STRAIN OF LEFT CALF MUSCLE: ICD-10-CM

## 2022-03-12 PROCEDURE — 73718 MRI LOWER EXTREMITY W/O DYE: CPT | Mod: 26 | Performed by: RADIOLOGY

## 2022-03-12 PROCEDURE — 73718 MRI LOWER EXTREMITY W/O DYE: CPT | Mod: LT

## 2022-03-27 ENCOUNTER — HEALTH MAINTENANCE LETTER (OUTPATIENT)
Age: 42
End: 2022-03-27

## 2022-03-31 DIAGNOSIS — M67.88 ACHILLES TENDINOSIS OF LEFT LOWER EXTREMITY: ICD-10-CM

## 2022-04-04 RX ORDER — NITROGLYCERIN 0.1MG/HR
PATCH, TRANSDERMAL 24 HOURS TRANSDERMAL
Qty: 30 PATCH | Refills: 3 | OUTPATIENT
Start: 2022-04-04

## 2022-05-11 ENCOUNTER — TELEPHONE (OUTPATIENT)
Dept: PEDIATRICS | Facility: CLINIC | Age: 42
End: 2022-05-11
Payer: COMMERCIAL

## 2022-05-11 NOTE — TELEPHONE ENCOUNTER
Due to provider call out, patient's appointment with Dr. Park needs to be rescheduled.  Left voicemail and sent [employer] message informing patient of this.  Requested call back to reschedule, and provided clinic number.     Tami Locke  Lead

## 2022-05-30 ASSESSMENT — ENCOUNTER SYMPTOMS
MYALGIAS: 1
DIARRHEA: 0
ARTHRALGIAS: 0
NAUSEA: 0
ABDOMINAL PAIN: 0
HEMATURIA: 0
NERVOUS/ANXIOUS: 1
FEVER: 0
BREAST MASS: 0
CHILLS: 0
HEADACHES: 0
EYE PAIN: 0
FREQUENCY: 1
DYSURIA: 0
HEARTBURN: 0
CONSTIPATION: 0
HEMATOCHEZIA: 0
SORE THROAT: 0
SHORTNESS OF BREATH: 0
PARESTHESIAS: 0
COUGH: 0
PALPITATIONS: 0
WEAKNESS: 0
JOINT SWELLING: 0

## 2022-06-01 ENCOUNTER — OFFICE VISIT (OUTPATIENT)
Dept: FAMILY MEDICINE | Facility: CLINIC | Age: 42
End: 2022-06-01
Payer: COMMERCIAL

## 2022-06-01 ENCOUNTER — MYC MEDICAL ADVICE (OUTPATIENT)
Dept: FAMILY MEDICINE | Facility: CLINIC | Age: 42
End: 2022-06-01

## 2022-06-01 VITALS
TEMPERATURE: 98 F | BODY MASS INDEX: 33.11 KG/M2 | OXYGEN SATURATION: 96 % | HEART RATE: 72 BPM | RESPIRATION RATE: 16 BRPM | DIASTOLIC BLOOD PRESSURE: 80 MMHG | SYSTOLIC BLOOD PRESSURE: 100 MMHG | WEIGHT: 231.3 LBS | HEIGHT: 70 IN

## 2022-06-01 DIAGNOSIS — Z00.00 ROUTINE GENERAL MEDICAL EXAMINATION AT A HEALTH CARE FACILITY: Primary | ICD-10-CM

## 2022-06-01 DIAGNOSIS — Z11.3 ROUTINE SCREENING FOR STI (SEXUALLY TRANSMITTED INFECTION): Primary | ICD-10-CM

## 2022-06-01 DIAGNOSIS — Z86.19 HISTORY OF HPV INFECTION: ICD-10-CM

## 2022-06-01 DIAGNOSIS — F41.1 GENERALIZED ANXIETY DISORDER: ICD-10-CM

## 2022-06-01 DIAGNOSIS — Z11.4 SCREENING FOR HIV (HUMAN IMMUNODEFICIENCY VIRUS): ICD-10-CM

## 2022-06-01 DIAGNOSIS — R73.01 ELEVATED FASTING BLOOD SUGAR: ICD-10-CM

## 2022-06-01 DIAGNOSIS — Z11.59 NEED FOR HEPATITIS C SCREENING TEST: ICD-10-CM

## 2022-06-01 DIAGNOSIS — R35.0 URINARY FREQUENCY: ICD-10-CM

## 2022-06-01 PROBLEM — G44.209 TENSION HEADACHE: Status: RESOLVED | Noted: 2017-01-27 | Resolved: 2022-06-01

## 2022-06-01 PROBLEM — M54.50 LOW BACK PAIN: Status: RESOLVED | Noted: 2017-01-27 | Resolved: 2022-06-01

## 2022-06-01 PROBLEM — Z72.0 TOBACCO USE: Status: RESOLVED | Noted: 2017-01-27 | Resolved: 2022-06-01

## 2022-06-01 PROBLEM — K58.9 IRRITABLE BOWEL SYNDROME: Status: RESOLVED | Noted: 2017-01-27 | Resolved: 2022-06-01

## 2022-06-01 LAB
ALBUMIN SERPL-MCNC: 4 G/DL (ref 3.4–5)
ALBUMIN UR-MCNC: NEGATIVE MG/DL
ALP SERPL-CCNC: 68 U/L (ref 40–150)
ALT SERPL W P-5'-P-CCNC: 22 U/L (ref 0–70)
ANION GAP SERPL CALCULATED.3IONS-SCNC: 8 MMOL/L (ref 3–14)
APPEARANCE UR: CLEAR
AST SERPL W P-5'-P-CCNC: 13 U/L (ref 0–45)
BILIRUB SERPL-MCNC: 1.4 MG/DL (ref 0.2–1.3)
BILIRUB UR QL STRIP: NEGATIVE
BUN SERPL-MCNC: 13 MG/DL (ref 7–30)
CALCIUM SERPL-MCNC: 9.4 MG/DL (ref 8.5–10.1)
CHLORIDE BLD-SCNC: 109 MMOL/L (ref 94–109)
CHOLEST SERPL-MCNC: 181 MG/DL
CO2 SERPL-SCNC: 26 MMOL/L (ref 20–32)
COLOR UR AUTO: YELLOW
CREAT SERPL-MCNC: 1.09 MG/DL (ref 0.66–1.25)
FASTING STATUS PATIENT QL REPORTED: YES
GFR SERPL CREATININE-BSD FRML MDRD: 86 ML/MIN/1.73M2
GLUCOSE BLD-MCNC: 105 MG/DL (ref 70–99)
GLUCOSE UR STRIP-MCNC: NEGATIVE MG/DL
HBA1C MFR BLD: 5.1 % (ref 0–5.6)
HDLC SERPL-MCNC: 44 MG/DL
HGB UR QL STRIP: NEGATIVE
KETONES UR STRIP-MCNC: NEGATIVE MG/DL
LDLC SERPL CALC-MCNC: 121 MG/DL
LEUKOCYTE ESTERASE UR QL STRIP: NEGATIVE
NITRATE UR QL: NEGATIVE
NONHDLC SERPL-MCNC: 137 MG/DL
PH UR STRIP: 6 [PH] (ref 5–7)
POTASSIUM BLD-SCNC: 4 MMOL/L (ref 3.4–5.3)
PROT SERPL-MCNC: 7.7 G/DL (ref 6.8–8.8)
PSA SERPL-MCNC: 0.99 UG/L (ref 0–4)
SODIUM SERPL-SCNC: 143 MMOL/L (ref 133–144)
SP GR UR STRIP: <=1.005 (ref 1–1.03)
TRIGL SERPL-MCNC: 82 MG/DL
UROBILINOGEN UR STRIP-ACNC: 0.2 E.U./DL

## 2022-06-01 PROCEDURE — G0103 PSA SCREENING: HCPCS | Performed by: PHYSICIAN ASSISTANT

## 2022-06-01 PROCEDURE — 80053 COMPREHEN METABOLIC PANEL: CPT | Performed by: PHYSICIAN ASSISTANT

## 2022-06-01 PROCEDURE — 86803 HEPATITIS C AB TEST: CPT | Performed by: PHYSICIAN ASSISTANT

## 2022-06-01 PROCEDURE — 87389 HIV-1 AG W/HIV-1&-2 AB AG IA: CPT | Performed by: PHYSICIAN ASSISTANT

## 2022-06-01 PROCEDURE — 99396 PREV VISIT EST AGE 40-64: CPT | Performed by: PHYSICIAN ASSISTANT

## 2022-06-01 PROCEDURE — 99213 OFFICE O/P EST LOW 20 MIN: CPT | Mod: 25 | Performed by: PHYSICIAN ASSISTANT

## 2022-06-01 PROCEDURE — 80061 LIPID PANEL: CPT | Performed by: PHYSICIAN ASSISTANT

## 2022-06-01 PROCEDURE — 36415 COLL VENOUS BLD VENIPUNCTURE: CPT | Performed by: PHYSICIAN ASSISTANT

## 2022-06-01 PROCEDURE — 83036 HEMOGLOBIN GLYCOSYLATED A1C: CPT | Performed by: PHYSICIAN ASSISTANT

## 2022-06-01 PROCEDURE — 81003 URINALYSIS AUTO W/O SCOPE: CPT | Performed by: PHYSICIAN ASSISTANT

## 2022-06-01 ASSESSMENT — ENCOUNTER SYMPTOMS
PALPITATIONS: 0
SHORTNESS OF BREATH: 0
COUGH: 0
CHILLS: 0
DIARRHEA: 0
HEMATURIA: 0
ABDOMINAL PAIN: 0
MYALGIAS: 1
FREQUENCY: 1
NAUSEA: 0
FEVER: 0
DYSURIA: 0
SORE THROAT: 0
EYE PAIN: 0
JOINT SWELLING: 0
CONSTIPATION: 0
WEAKNESS: 0
NERVOUS/ANXIOUS: 1
HEADACHES: 0
ARTHRALGIAS: 0

## 2022-06-01 ASSESSMENT — PAIN SCALES - GENERAL: PAINLEVEL: NO PAIN (0)

## 2022-06-01 NOTE — TELEPHONE ENCOUNTER
"Called patient. He \"did something stupid\" about 2 weeks ago and is worried about possible STI. Has had some discomfort with ejaculation. Says he forgot to mention this at our visit today. Does have history of HPV but no other STI history. Will add gonorrhea and chlamydia testing, along with syphilis testing. Would recommend follow-up for visit if persistent symptoms.    Carissa Petersen PA-C   "

## 2022-06-01 NOTE — PROGRESS NOTES
SUBJECTIVE:   CC: Felix Winslow is an 41 year old who presents for preventive health visit.     {  Patient has been advised of split billing requirements and indicates understanding: Yes  Healthy Habits:     Getting at least 3 servings of Calcium per day:  NO    Bi-annual eye exam:  Yes    Dental care twice a year:  Yes    Sleep apnea or symptoms of sleep apnea:  None    Diet:  Carbohydrate counting    Frequency of exercise:  4-5 days/week    Duration of exercise:  Greater than 60 minutes    Taking medications regularly:  Yes    Medication side effects:  None    PHQ-2 Total Score: 2     He has noticed some increased urinary frequency recently. No increased thirst, but he does try to drink a lot of fluids. Yesterday, he drank almost 100 ounces, states he urinated 13 times before 3 PM. Not up at night urinating. No dysuria, hematuria, urgency. He otherwise feels well, just wonders if this is normal. No history of diabetes.    Works as a . Travels often for work.    Today's PHQ-2 Score:   PHQ-2 ( 1999 [employer]) 5/30/2022   Q1: Little interest or pleasure in doing things 1   Q2: Feeling down, depressed or hopeless 1   PHQ-2 Score 2   PHQ-2 Total Score (12-17 Years)- Positive if 3 or more points; Administer PHQ-A if positive -   Q1: Little interest or pleasure in doing things Several days   Q2: Feeling down, depressed or hopeless Not at all   PHQ-2 Score 1       Abuse: Current or Past (Physical, Sexual or Emotional) - No  Do you feel safe in your environment? Yes    Have you ever done Advance Care Planning? (For example, a Health Directive, POLST, or a discussion with a medical provider or your loved ones about your wishes): Yes, patient states has an Advance Care Planning document and will bring a copy to the clinic.    Social History     Tobacco Use     Smoking status: Never Smoker     Smokeless tobacco: Former User     Quit date: 5/2/2021     Tobacco comment: Dip tobacco for 20 years, quit 2021    Substance Use Topics     Alcohol use: Yes     If you drink alcohol do you typically have >3 drinks per day or >7 drinks per week? No    Alcohol Use 2022   Prescreen: >3 drinks/day or >7 drinks/week? -   Prescreen: >3 drinks/day or >7 drinks/week? No       Reviewed orders with patient.  Reviewed health maintenance and updated orders accordingly - Yes  Lab work is in process  Labs reviewed in EPIC  BP Readings from Last 3 Encounters:   22 100/80   22 124/84   22 126/88    Wt Readings from Last 3 Encounters:   22 104.9 kg (231 lb 4.8 oz)   22 104.3 kg (230 lb)   22 104.3 kg (230 lb)                  Patient Active Problem List   Diagnosis     Generalized anxiety disorder     Obesity     Vitamin D deficiency     Pain of left lower leg     History reviewed. No pertinent surgical history.    Social History     Tobacco Use     Smoking status: Never Smoker     Smokeless tobacco: Former User     Quit date: 2021     Tobacco comment: Dip tobacco for 20 years, quit    Substance Use Topics     Alcohol use: Yes     Family History   Problem Relation Age of Onset     Bladder Cancer Mother         metastasized to bones, lungs     Myocardial Infarction Father 72     Diabetes Father         Type 2.  at 72 heart failure     Heart Failure Father      Emphysema Maternal Grandmother      Myocardial Infarction Paternal Grandfather      Colon Cancer No family hx of      Prostate Cancer No family hx of          Current Outpatient Medications   Medication Sig Dispense Refill     clindamycin (CLEOCIN T) 1 % external lotion Apply topically 2 times daily (Patient taking differently: Apply topically 2 times daily PRN) 60 mL 0     No Active Allergies  Recent Labs   Lab Test 22  1019 21  0000 21  0825 19  1118   A1C 5.1  --   --   --    LDL  --   --  105* 140*   HDL  --   --  36* 45   TRIG  --   --  259* 106   ALT  --  22  --   --    CR  --  1.17  --  1.03   GFRESTIMATED   "--  76  --  90   GFRESTBLACK  --  89  --  >90   POTASSIUM  --  3.9  --  4.2   TSH  --  2.02  --   --            Reviewed and updated as needed this visit by clinical staff   Tobacco  Allergies  Meds  Problems  Med Hx  Surg Hx  Fam Hx  Soc   Hx          Reviewed and updated as needed this visit by Provider      Review of Systems   Constitutional: Negative for chills and fever.   HENT: Negative for congestion, hearing loss and sore throat.    Eyes: Negative for pain and visual disturbance.   Respiratory: Negative for cough and shortness of breath.    Cardiovascular: Negative for chest pain and palpitations.   Gastrointestinal: Negative for abdominal pain, constipation, diarrhea and nausea.   Genitourinary: Positive for frequency and genital sores (reports history of genital warts, no current sores). Negative for dysuria, hematuria and urgency.   Musculoskeletal: Positive for myalgias. Negative for arthralgias and joint swelling.   Skin: Negative for rash.   Neurological: Negative for weakness and headaches.   Psychiatric/Behavioral: The patient is nervous/anxious.      History of anxiety - was on meds in the past but not for a while. He did counseling in the past as well but didn't really connect with his therapist. He would be interested in trying counseling again.     OBJECTIVE:   /80 (BP Location: Right arm, Patient Position: Sitting, Cuff Size: Adult Large)   Pulse 72   Temp 98  F (36.7  C) (Oral)   Resp 16   Ht 1.778 m (5' 10\")   Wt 104.9 kg (231 lb 4.8 oz)   SpO2 96%   BMI 33.19 kg/m    Physical Exam  GENERAL: healthy, alert and no distress  EYES: Eyes grossly normal to inspection, PERRL and conjunctivae and sclerae normal  HENT: ear canals and TM's normal, nose and mouth without ulcers or lesions  NECK: no adenopathy, no asymmetry, masses, or scars and thyroid normal to palpation  RESP: lungs clear to auscultation - no rales, rhonchi or wheezes  CV: regular rate and rhythm, normal S1 S2, no " S3 or S4, no murmur, click or rub, no peripheral edema and peripheral pulses strong  ABDOMEN: soft, nontender, no hepatosplenomegaly, no masses and bowel sounds normal   (male): normal male genitalia without lesions or urethral discharge, no hernia  MS: no gross musculoskeletal defects noted, no edema  SKIN: no suspicious lesions or rashes  NEURO: Normal strength and tone, mentation intact and speech normal  PSYCH: mentation appears normal, affect normal/bright    Diagnostic Test Results:  Labs reviewed in Epic    ASSESSMENT/PLAN:   Routine general medical examination at a health care facility  Reviewed personal and family history. Reviewed age appropriate screenings. Reviewed healthy BP and BMI ranges. Counseled on lifestyle modifications for optimal mental and physical health.  Discussed age-appropriate health maintanence. Recommended any needed vaccinations. Continue to focus on well balanced diet and exercise.   - Lipid panel reflex to direct LDL Fasting; Future  - Comprehensive metabolic panel (BMP + Alb, Alk Phos, ALT, AST, Total. Bili, TP); Future  - Lipid panel reflex to direct LDL Fasting  - Comprehensive metabolic panel (BMP + Alb, Alk Phos, ALT, AST, Total. Bili, TP)    Screening for HIV (human immunodeficiency virus)  Discussed, agrees  - HIV Antigen Antibody Combo; Future  - HIV Antigen Antibody Combo    Need for hepatitis C screening test  Discussed, agrees  - Hepatitis C Screen Reflex to HCV RNA Quant and Genotype; Future  - Hepatitis C Screen Reflex to HCV RNA Quant and Genotype    Elevated fasting blood sugar  A1c normal today  - Hemoglobin A1c; Future  - Hemoglobin A1c    Urinary frequency  UA normal without sign of infection. A1c normal so no indication of diabetes. Will check PSA and metabolic panel. Follow-up per results. He could try to reduce fluids to 64 ounces per day to see if that helps.  - UA Macro with Reflex to Micro and Culture - lab collect; Future  - PSA, screen; Future  - UA Macro  "with Reflex to Micro and Culture - lab collect  - PSA, screen    Generalized anxiety disorder  History of anxiety - was on meds in the past but not for a while. He did counseling in the past as well but didn't really connect with his therapist. He would be interested in trying counseling again.  - Adult Mental Health  Referral; Future    COUNSELING:  Reviewed preventive health counseling, as reflected in patient instructions    Estimated body mass index is 33.19 kg/m  as calculated from the following:    Height as of this encounter: 1.778 m (5' 10\").    Weight as of this encounter: 104.9 kg (231 lb 4.8 oz).    Weight management plan: Discussed healthy diet and exercise guidelines    Felix Winslow reports that he has never smoked. He quit smokeless tobacco use about 12 months ago.      Counseling Resources:  ATP IV Guidelines  Pooled Cohorts Equation Calculator  Breast Cancer Risk Calculator  BRCA-Related Cancer Risk Assessment: FHS-7 Tool  FRAX Risk Assessment  ICSI Preventive Guidelines  Dietary Guidelines for Americans, 2010  [employer]'s MyPlate  ASA Prophylaxis  Lung CA Screening    Carissa Petersen PA-C  [facility]  Answers for HPI/ROS submitted by the patient on 5/30/2022  Blood in stool: No  heartburn: No  peripheral edema: No  mood changes: Yes  Skin sensation changes: No  pelvic pain: No  vaginal bleeding: No  vaginal discharge: No  tenderness: No  breast mass: No  breast discharge: No  impotence: No  penile discharge: No      "

## 2022-06-01 NOTE — TELEPHONE ENCOUNTER
Pt calls.      He is wondering if he could add some more labs on to lab work done - sexual transmitted disease - chlamydia and gonnorhea.      He said he can come in if needed.      Will forward to Carissa Petersen.

## 2022-06-02 ENCOUNTER — LAB (OUTPATIENT)
Dept: LAB | Facility: CLINIC | Age: 42
End: 2022-06-02
Payer: COMMERCIAL

## 2022-06-02 DIAGNOSIS — Z11.3 ROUTINE SCREENING FOR STI (SEXUALLY TRANSMITTED INFECTION): ICD-10-CM

## 2022-06-02 LAB
HCV AB SERPL QL IA: NONREACTIVE
HIV 1+2 AB+HIV1 P24 AG SERPL QL IA: NONREACTIVE
T PALLIDUM AB SER QL: NONREACTIVE

## 2022-06-02 PROCEDURE — 87491 CHLMYD TRACH DNA AMP PROBE: CPT

## 2022-06-02 PROCEDURE — 87591 N.GONORRHOEAE DNA AMP PROB: CPT

## 2022-06-03 DIAGNOSIS — R17 ELEVATED BILIRUBIN: Primary | ICD-10-CM

## 2022-06-03 LAB
C TRACH DNA SPEC QL NAA+PROBE: NEGATIVE
N GONORRHOEA DNA SPEC QL NAA+PROBE: NEGATIVE

## 2022-09-18 ENCOUNTER — HEALTH MAINTENANCE LETTER (OUTPATIENT)
Age: 42
End: 2022-09-18

## 2023-01-09 ENCOUNTER — MYC MEDICAL ADVICE (OUTPATIENT)
Dept: FAMILY MEDICINE | Facility: CLINIC | Age: 43
End: 2023-01-09

## 2023-01-09 ENCOUNTER — OFFICE VISIT (OUTPATIENT)
Dept: URGENT CARE | Facility: URGENT CARE | Age: 43
End: 2023-01-09
Payer: COMMERCIAL

## 2023-01-09 VITALS
DIASTOLIC BLOOD PRESSURE: 82 MMHG | TEMPERATURE: 98 F | BODY MASS INDEX: 34.15 KG/M2 | OXYGEN SATURATION: 97 % | SYSTOLIC BLOOD PRESSURE: 124 MMHG | HEART RATE: 111 BPM | WEIGHT: 238 LBS

## 2023-01-09 DIAGNOSIS — H61.21 IMPACTED CERUMEN OF RIGHT EAR: Primary | ICD-10-CM

## 2023-01-09 PROCEDURE — 99213 OFFICE O/P EST LOW 20 MIN: CPT | Performed by: PHYSICIAN ASSISTANT

## 2023-01-09 NOTE — PROCEDURES
Patient identified using two patient identifiers.  Ear exam showing wax occlusion completed by provider.  H202/H20 was placed in the right ear(s) via irrigation tool: elephant ear.    Avril Hills MA

## 2023-01-09 NOTE — PROGRESS NOTES
SUBJECTIVE:  Felix Winslow is a 42 year old male who comes in with a 2-day history of waking up with right ear feeling clogged.  Patient states that it feels slightly better after he sits up for a while.  Is currently seeing his chiropractor for some neck issue.  States that he looked into today and told him he had a lot of wax buildup.  Left ear has also been popping some if he blows through his nose.  Has been using some eardrops with mild relief.  Hearing also feels muffled.  He has no other associated symptoms    Past Medical History:   Diagnosis Date     Irritable bowel syndrome 1/27/2017    Irritable bowel syndrome. Service date: 02/02/2016. Author: Maddie Hansen MD.     Rosacea 1/22/2015    Rosacea. Service date: 02/02/2016. Author: Maddie Hansen MD. Comment: more recent diagnosis by [facility] - metrogel in past - overall limited activity:.  Rosacea; *See attached note in LMR     Tension headache 1/27/2017    Tension headache. Service date: 02/02/2016. Author: Maddie Hansen MD. Comment: 1/20/14h/o in past more recently had one that stuck around for multiple days mild to moderate in intensity no associated photo/phonophobia no associated neck stiffness no associated N/V no head trauma no reported focal neurologic compromise or altered consciousness benign exam today without suggestion of sinus, migr     Tobacco use 1/27/2017    Tobacco user. Service date: 02/02/2016. Author: Maddie Hansen MD. Comment: 2/2/16 currently in process of quitting 1/20/14 smokeless use, no mouth lesion, mouth pain, dysphagia/odynophagia, counselled re:cessation re:CA risks:.     Uncomplicated asthma 1986    Outgrew it.     Current Outpatient Medications   Medication     clindamycin (CLEOCIN T) 1 % external lotion     No current facility-administered medications for this visit.     Social History     Socioeconomic History     Marital status:      Spouse name: Not on file     Number of children: Not on  file     Years of education: Not on file     Highest education level: Not on file   Occupational History     Not on file   Tobacco Use     Smoking status: Never     Smokeless tobacco: Former     Quit date: 5/2/2021     Tobacco comments:     Dip tobacco for 20 years, quit 2021   Substance and Sexual Activity     Alcohol use: Yes     Drug use: Never     Sexual activity: Yes     Partners: Female     Birth control/protection: Pill   Other Topics Concern     Parent/sibling w/ CABG, MI or angioplasty before 65F 55M? No   Social History Narrative     Not on file     Social Determinants of Health     Financial Resource Strain: Not on file   Food Insecurity: Not on file   Transportation Needs: Not on file   Physical Activity: Not on file   Stress: Not on file   Social Connections: Not on file   Intimate Partner Violence: Not on file   Housing Stability: Not on file     ROS negative other than stated above    Exam:  GENERAL APPEARANCE: healthy, alert and no distress  EYES: EOMI,  PERRL  HENT: Right TM unable to be visualized due to cerumen impaction in the canal.  Left TM is clear of erythema.  There is small amount of clear fluid noted.  Canals clear.  SKIN: no suspicious lesions or rashes    assessment/plan:  (H61.21) Impacted cerumen of right ear  (primary encounter diagnosis)  Comment:   Plan:   Patient with 2-day history of right ear feeling clogged with wax today.  Ear was flushed with complete removal.  No signs of infection.  Continue with supportive cares and follow-up as needed

## 2023-02-09 ENCOUNTER — MYC REFILL (OUTPATIENT)
Dept: PEDIATRICS | Facility: CLINIC | Age: 43
End: 2023-02-09
Payer: COMMERCIAL

## 2023-02-09 DIAGNOSIS — L70.9 ACNE, UNSPECIFIED ACNE TYPE: ICD-10-CM

## 2023-02-13 NOTE — TELEPHONE ENCOUNTER
I did not see he had a physical at another  clinic.  I will route to his new PCP/last provider to do his physical since his PCP is no longer at our clinic  OFELIA Lozano, CNP

## 2023-02-14 ENCOUNTER — VIRTUAL VISIT (OUTPATIENT)
Dept: FAMILY MEDICINE | Facility: CLINIC | Age: 43
End: 2023-02-14
Payer: COMMERCIAL

## 2023-02-14 DIAGNOSIS — L70.9 ACNE, UNSPECIFIED ACNE TYPE: ICD-10-CM

## 2023-02-14 PROCEDURE — 99213 OFFICE O/P EST LOW 20 MIN: CPT | Mod: VID | Performed by: PHYSICIAN ASSISTANT

## 2023-02-14 RX ORDER — CLINDAMYCIN PHOSPHATE 10 UG/ML
LOTION TOPICAL 2 TIMES DAILY
Qty: 60 ML | Refills: 0 | OUTPATIENT
Start: 2023-02-14

## 2023-02-14 RX ORDER — CLINDAMYCIN PHOSPHATE 10 UG/ML
LOTION TOPICAL 2 TIMES DAILY
Qty: 60 ML | Refills: 3 | Status: SHIPPED | OUTPATIENT
Start: 2023-02-14 | End: 2024-06-18

## 2023-02-14 NOTE — PROGRESS NOTES
Felix is a 42 year old who is being evaluated via a billable video visit.      How would you like to obtain your AVS? MyChart  If the video visit is dropped, the invitation should be resent by: Text to cell phone: [phone]  Will anyone else be joining your video visit? No          Assessment & Plan     1. Acne, unspecified acne type  Chronic, stable.  Well-controlled with Clindagel.  Refill sent.  - clindamycin (CLEOCIN T) 1 % external lotion; Apply topically 2 times daily  Dispense: 60 mL; Refill: 3        Subjective   Felix is a 42 year old accompanied by his self, presenting for the following health issues:  Recheck Medication (For skin cream )      HPI     Needs refill of his Clindagel.  He gets after knee off-and-on, especially when it is hot out.  He uses this as needed.  Seems to control his symptoms very well.  He tried to get this refilled with the doctor who saw him at his physical, but since they had not discussed that at his physical, the provider would not refill it.          Objective           Vitals:  No vitals were obtained today due to virtual visit.    Physical Exam   GENERAL: Healthy, alert and no distress  EYES: Eyes grossly normal to inspection.  No discharge or erythema, or obvious scleral/conjunctival abnormalities.  RESP: No audible wheeze, cough, or visible cyanosis.  No visible retractions or increased work of breathing.    SKIN: Visible skin clear. No significant rash, abnormal pigmentation or lesions.  NEURO: Cranial nerves grossly intact.  Mentation and speech appropriate for age.  PSYCH: Mentation appears normal, affect normal/bright, judgement and insight intact, normal speech and appearance well-groomed.            Video-Visit Details    Type of service:  Video Visit     Originating Location (pt. Location): Home    Distant Location (provider location):  On-site  Platform used for Video Visit: Jumio        Answers for HPI/ROS submitted by the patient on 2/14/2023  What is the  reason for your visit today? : Skin rash prevention  How many servings of fruits and vegetables do you eat daily?: 2-3  On average, how many sweetened beverages do you drink each day (Examples: soda, juice, sweet tea, etc.  Do NOT count diet or artificially sweetened beverages)?: 1  How many minutes a day do you exercise enough to make your heart beat faster?: 20 to 29  How many days a week do you exercise enough to make your heart beat faster?: 4  How many days per week do you miss taking your medication?: 0

## 2023-02-14 NOTE — TELEPHONE ENCOUNTER
This was not discussed during physical. Per AGELON ? messages, he will be scheduling e-visit or virtual visit.    Carissa Petersen PA-C

## 2023-07-30 ENCOUNTER — HEALTH MAINTENANCE LETTER (OUTPATIENT)
Age: 43
End: 2023-07-30

## 2024-02-05 SDOH — HEALTH STABILITY: PHYSICAL HEALTH: ON AVERAGE, HOW MANY MINUTES DO YOU ENGAGE IN EXERCISE AT THIS LEVEL?: 30 MIN

## 2024-02-05 SDOH — HEALTH STABILITY: PHYSICAL HEALTH: ON AVERAGE, HOW MANY DAYS PER WEEK DO YOU ENGAGE IN MODERATE TO STRENUOUS EXERCISE (LIKE A BRISK WALK)?: 5 DAYS

## 2024-02-05 ASSESSMENT — SOCIAL DETERMINANTS OF HEALTH (SDOH): HOW OFTEN DO YOU GET TOGETHER WITH FRIENDS OR RELATIVES?: ONCE A WEEK

## 2024-02-06 ENCOUNTER — OFFICE VISIT (OUTPATIENT)
Dept: FAMILY MEDICINE | Facility: CLINIC | Age: 44
End: 2024-02-06
Payer: COMMERCIAL

## 2024-02-06 VITALS
WEIGHT: 237.2 LBS | DIASTOLIC BLOOD PRESSURE: 82 MMHG | HEART RATE: 84 BPM | OXYGEN SATURATION: 98 % | TEMPERATURE: 98.2 F | BODY MASS INDEX: 33.96 KG/M2 | RESPIRATION RATE: 15 BRPM | HEIGHT: 70 IN | SYSTOLIC BLOOD PRESSURE: 128 MMHG

## 2024-02-06 DIAGNOSIS — Z12.11 SCREEN FOR COLON CANCER: Primary | ICD-10-CM

## 2024-02-06 DIAGNOSIS — E78.5 ELEVATED LIPIDS: ICD-10-CM

## 2024-02-06 DIAGNOSIS — R73.01 ELEVATED FASTING BLOOD SUGAR: ICD-10-CM

## 2024-02-06 DIAGNOSIS — Z00.00 ROUTINE HISTORY AND PHYSICAL EXAMINATION OF ADULT: ICD-10-CM

## 2024-02-06 LAB
ALBUMIN SERPL BCG-MCNC: 4.7 G/DL (ref 3.5–5.2)
ALP SERPL-CCNC: 61 U/L (ref 40–150)
ALT SERPL W P-5'-P-CCNC: 26 U/L (ref 0–70)
ANION GAP SERPL CALCULATED.3IONS-SCNC: 10 MMOL/L (ref 7–15)
AST SERPL W P-5'-P-CCNC: 20 U/L (ref 0–45)
BILIRUB SERPL-MCNC: 1.3 MG/DL
BUN SERPL-MCNC: 15.3 MG/DL (ref 6–20)
CALCIUM SERPL-MCNC: 9.8 MG/DL (ref 8.6–10)
CHLORIDE SERPL-SCNC: 103 MMOL/L (ref 98–107)
CHOLEST SERPL-MCNC: 232 MG/DL
CREAT SERPL-MCNC: 1.08 MG/DL (ref 0.67–1.17)
DEPRECATED HCO3 PLAS-SCNC: 26 MMOL/L (ref 22–29)
EGFRCR SERPLBLD CKD-EPI 2021: 86 ML/MIN/1.73M2
FASTING STATUS PATIENT QL REPORTED: YES
GLUCOSE SERPL-MCNC: 114 MG/DL (ref 70–99)
HDLC SERPL-MCNC: 45 MG/DL
LDLC SERPL CALC-MCNC: 168 MG/DL
NONHDLC SERPL-MCNC: 187 MG/DL
POTASSIUM SERPL-SCNC: 4.8 MMOL/L (ref 3.4–5.3)
PROT SERPL-MCNC: 7.5 G/DL (ref 6.4–8.3)
SODIUM SERPL-SCNC: 139 MMOL/L (ref 135–145)
TRIGL SERPL-MCNC: 94 MG/DL

## 2024-02-06 PROCEDURE — 80053 COMPREHEN METABOLIC PANEL: CPT | Performed by: NURSE PRACTITIONER

## 2024-02-06 PROCEDURE — 99396 PREV VISIT EST AGE 40-64: CPT | Performed by: NURSE PRACTITIONER

## 2024-02-06 PROCEDURE — 80061 LIPID PANEL: CPT | Performed by: NURSE PRACTITIONER

## 2024-02-06 PROCEDURE — 36415 COLL VENOUS BLD VENIPUNCTURE: CPT | Performed by: NURSE PRACTITIONER

## 2024-02-06 ASSESSMENT — PAIN SCALES - GENERAL: PAINLEVEL: SEVERE PAIN (7)

## 2024-02-06 NOTE — PROGRESS NOTES
"Preventive Care Visit  [facility] OFELIA Ha Ra CNP, Family Practice  Feb 6, 2024    Assessment & Plan     Screen for colon cancer  - Colonoscopy Screening  Referral; Future    Routine history and physical examination of adult  - Comprehensive metabolic panel (BMP + Alb, Alk Phos, ALT, AST, Total. Bili, TP); Future  - Lipid panel reflex to direct LDL Fasting; Future  - Comprehensive metabolic panel (BMP + Alb, Alk Phos, ALT, AST, Total. Bili, TP)  - Lipid panel reflex to direct LDL Fasting            BMI  Estimated body mass index is 33.79 kg/m  as calculated from the following:    Height as of this encounter: 1.784 m (5' 10.25\").    Weight as of this encounter: 107.6 kg (237 lb 3.2 oz).       Counseling  Appropriate preventive services were discussed with this patient, including applicable screening as appropriate for fall prevention, nutrition, physical activity, Tobacco-use cessation, weight loss and cognition.  Checklist reviewing preventive services available has been given to the patient.  Reviewed patient's diet, addressing concerns and/or questions.             Alyssa Washington is a 43 year old, presenting for the following:  Physical (Pt fasting )        2/6/2024    10:44 AM   Additional Questions   Roomed by Ana Maria PACHECO LPN        Health Care Directive  Patient does not have a Health Care Directive or Living Will:     HPI              2/5/2024   General Health   How would you rate your overall physical health? Good   Feel stress (tense, anxious, or unable to sleep) To some extent   (!) STRESS CONCERN      2/5/2024   Nutrition   Three or more servings of calcium each day? (!) I DON'T KNOW   Diet: Carbohydrate counting   How many servings of fruit and vegetables per day? (!) 0-1   How many sweetened beverages each day? 0-1         2/5/2024   Exercise   Days per week of moderate/strenous exercise 5 days   Average minutes spent exercising at this level 30 min        "  2/5/2024   Social Factors   Frequency of gathering with friends or relatives Once a week   Worry food won't last until get money to buy more No   Food not last or not have enough money for food? No   Do you have housing?  Yes   Are you worried about losing your housing? No   Lack of transportation? No   Unable to get utilities (heat,electricity)? No         2/5/2024   Dental   Dentist two times every year? Yes         2/5/2024   TB Screening   Were you born outside of [place]?  No         Today's PHQ-2 Score:       2/5/2024     2:34 PM   PHQ-2 ( 1999 [employer])   Q1: Little interest or pleasure in doing things 0   Q2: Feeling down, depressed or hopeless 0   PHQ-2 Score 0   Q1: Little interest or pleasure in doing things Not at all   Q2: Feeling down, depressed or hopeless Not at all   PHQ-2 Score 0           2/5/2024   Substance Use   Alcohol more than 3/day or more than 7/wk No   Do you use any other substances recreationally? No     Social History     Tobacco Use    Smoking status: Never    Smokeless tobacco: Former     Quit date: 5/2/2021    Tobacco comments:     Dip tobacco for 20 years, quit 2021   Substance Use Topics    Alcohol use: Yes    Drug use: Never           2/5/2024   STI Screening   New sexual partner(s) since last STI/HIV test? No   The 10-year ASCVD risk score (Dejan CORCORAN, et al., 2019) is: 2%    Values used to calculate the score:      Age: 43 years      Sex: Male      Is Non- : No      Diabetic: No      Tobacco smoker: No      Systolic Blood Pressure: 128 mmHg      Is BP treated: No      HDL Cholesterol: 44 mg/dL      Total Cholesterol: 181 mg/dL        2/5/2024   Contraception/Family Planning   Questions about contraception or family planning No        Reviewed and updated as needed this visit by Provider                    Patient Active Problem List   Diagnosis    Generalized anxiety disorder    Obesity    Vitamin D deficiency    Pain of left lower leg    History of HPV  "infection     No past surgical history on file.    Social History     Tobacco Use    Smoking status: Never    Smokeless tobacco: Former     Quit date: 2021    Tobacco comments:     Dip tobacco for 20 years, quit    Substance Use Topics    Alcohol use: Yes     Family History   Problem Relation Age of Onset    Bladder Cancer Mother         metastasized to bones, lungs    Myocardial Infarction Father 72    Diabetes Father         Type 2.  at 72 heart failure    Heart Failure Father     Emphysema Maternal Grandmother     Myocardial Infarction Paternal Grandfather     Colon Cancer No family hx of     Prostate Cancer No family hx of          Review of Systems    Review of Systems  Constitutional, HEENT, cardiovascular, pulmonary, gi and gu systems are negative, except as otherwise noted.     Objective    Exam  /82   Pulse 84   Temp 98.2  F (36.8  C) (Oral)   Resp 15   Ht 1.784 m (5' 10.\")   Wt 107.6 kg (237 lb 3.2 oz)   SpO2 98%   BMI 33.79 kg/m     Estimated body mass index is 33.79 kg/m  as calculated from the following:    Height as of this encounter: 1.784 m (5' 10.25\").    Weight as of this encounter: 107.6 kg (237 lb 3.2 oz).    Physical Exam  GENERAL: alert and no distress  EYES: Eyes grossly normal to inspection  HENT: ear canals and TM's normal, nose and mouth without ulcers or lesions  NECK: no adenopathy, no asymmetry, masses, or scars  RESP: lungs clear to auscultation - no rales, rhonchi or wheezes  CV: regular rate and rhythm, normal S1 S2, no S3 or S4, no murmur, click or rub, no peripheral edema  ABDOMEN: soft, nontender, no hepatosplenomegaly, no masses and bowel sounds normal  NEURO: Normal strength and tone, mentation intact and speech normal  PSYCH: mentation appears normal, affect normal/bright      Signed Electronically by: OFELIA Woods Ra CNP    "

## 2024-02-22 ENCOUNTER — TELEPHONE (OUTPATIENT)
Dept: GASTROENTEROLOGY | Facility: CLINIC | Age: 44
End: 2024-02-22
Payer: COMMERCIAL

## 2024-02-22 NOTE — TELEPHONE ENCOUNTER
"Endoscopy Scheduling Screen    Have you had a positive Covid test in the last 14 days?  No    Are you active on MyChart?   Yes    What insurance is in the chart?  Other:  [employer]    Ordering/Referring Provider:     SARAVANAN HOSKINS RA      (If ordering provider performs procedure, schedule with ordering provider unless otherwise instructed. )    BMI: Estimated body mass index is 33.79 kg/m  as calculated from the following:    Height as of 2/6/24: 1.784 m (5' 10.25\").    Weight as of 2/6/24: 107.6 kg (237 lb 3.2 oz).     Sedation Ordered  moderate sedation.   If patient BMI > 50 do not schedule in [facility].    If patient BMI > 45 do not schedule at [facility].    Are you taking methadone or Suboxone?  No    Have you had difficulties, pain, or discomfort during past endoscopy procedures?  No    Are you taking any prescription medications for pain 3 or more times per week?   NO - No RN review required.    Do you have a history of malignant hyperthermia or adverse reaction to anesthesia?  No    (Females) Are you currently pregnant?   No     Have you been diagnosed or told you have pulmonary hypertension?   No    Do you have an LVAD?  No    Have you been told you have moderate to severe sleep apnea?  No    Have you been told you have COPD, asthma, or any other lung disease?  No    Do you have any heart conditions?  No     Have you ever had an organ transplant?   No    Have you ever had or are you awaiting a heart or lung transplant?   No    Have you had a stroke or transient ischemic attack (TIA aka \"mini stroke\" in the last 6 months?   No    Have you been diagnosed with or been told you have cirrhosis of the liver?   No    Are you currently on dialysis?   No    Do you need assistance transferring?   No    BMI: Estimated body mass index is 33.79 kg/m  as calculated from the following:    Height as of 2/6/24: 1.784 m (5' 10.25\").    Weight as of 2/6/24: 107.6 kg (237 lb 3.2 oz).     Is patients BMI > 40 and scheduling location " [facility]?  No    Do you take an injectable medication for weight loss or diabetes (excluding insulin)?  No    Do you take the medication Naltrexone?  No    Do you take blood thinners?  No       Prep   Are you currently on dialysis or do you have chronic kidney disease?  No    Do you have a diagnosis of diabetes?  No    Do you have a diagnosis of cystic fibrosis (CF)?  No    On a regular basis do you go 3 -5 days between bowel movements?  No    BMI > 40?  No    Preferred Pharmacy:    [facility]/pharmacy #[id number] - [place], [place] - [address] [facility]  [address] [facility] [address]  Phone: [phone] Fax: [phone]    Final Scheduling Details   Colonoscopy prep sent?  N/A    Procedure scheduled  Colonoscopy    Surgeon:  RICARDO     Date of procedure:  04/24/2024    Pre-OP / PAC:   No - Not required for this site.    Location  [facility]  NEXT AVAILABLE WITH ANESTHESIA    Sedation   MAC/Deep Sedation - Patient preference.      Patient Reminders:   You will receive a call from a Nurse to review instructions and health history.  This assessment must be completed prior to your procedure.  Failure to complete the Nurse assessment may result in the procedure being cancelled.      On the day of your procedure, please designate an adult(s) who can drive you home stay with you for the next 24 hours. The medicines used in the exam will make you sleepy. You will not be able to drive.      You cannot take public transportation, ride share services, or non-medical taxi service without a responsible caregiver.  Medical transport services are allowed with the requirement that a responsible caregiver will receive you at your destination.  We require that drivers and caregivers are confirmed prior to your procedure.

## 2024-04-10 ENCOUNTER — TELEPHONE (OUTPATIENT)
Dept: GASTROENTEROLOGY | Facility: CLINIC | Age: 44
End: 2024-04-10
Payer: COMMERCIAL

## 2024-04-10 NOTE — TELEPHONE ENCOUNTER
Attempted to contact patient in order to complete pre assessment questions.     No answer. Left message to return call to [phone] option 4      Procedure details:    Patient scheduled for Colonoscopy  on 4.24.2024.     Arrival time: 1230. Procedure time 1330    Facility location: [facility]; [address] [place] [address] [address], [place] [address]. Check in location: 2nd Floor.    Sedation type: MAC    Pre op exam needed? N/A    Indication for procedure: screening colonoscopy      Chart review:     Electronic implanted devices? No    Recent diagnosis of diverticulitis within the last 6 weeks? No    Diabetic? No      Medication review:    Anticoagulants? No    NSAIDS? Yes.  Celebrex (celecoxib).  Holding interval of 3 days.    Other medication HOLDING recommendations:  N/A      Prep for procedure:     Bowel prep recommendation: Standard Miralax  Due to: standard bowel prep.    Prep instructions sent via [employer].       Coleen Sapp RN  Endoscopy Procedure Pre Assessment RN

## 2024-04-17 NOTE — TELEPHONE ENCOUNTER
Pre assessment completed for upcoming procedure.   (Please see previous telephone encounter notes for complete details)    Patient  returned call.       Procedure details:    Arrival time and facility location reviewed.    Pre op exam needed? N/A    Designated  policy reviewed. Instructed to have someone stay 24  hours post procedure.       Medication review:    Medications reviewed. Please see supporting documentation below. Holding recommendations discussed (if applicable).   N/A  Patient is not taking Celebrex      Prep for procedure:     Procedure prep instructions reviewed.        Any additional information needed:  N/A      Patient  verbalized understanding and had no questions or concerns at this time.      Maribell Trammell RN  Endoscopy Procedure Pre Assessment RN  [phone] option 4

## 2024-04-17 NOTE — TELEPHONE ENCOUNTER
Second call attempt to complete pre assessment.     No answer.  Left message to return call to [phone] #4.    Callback required communication sent via [employer].      Coleen Sapp RN  Endoscopy Procedure Pre Assessment RN

## 2024-04-24 ENCOUNTER — TRANSFERRED RECORDS (OUTPATIENT)
Dept: HEALTH INFORMATION MANAGEMENT | Facility: CLINIC | Age: 44
End: 2024-04-24
Payer: COMMERCIAL

## 2024-04-24 PROCEDURE — 88305 TISSUE EXAM BY PATHOLOGIST: CPT | Mod: 26 | Performed by: PATHOLOGY

## 2024-04-24 PROCEDURE — 88305 TISSUE EXAM BY PATHOLOGIST: CPT | Mod: TC,ORL | Performed by: COLON & RECTAL SURGERY

## 2024-04-25 ENCOUNTER — LAB REQUISITION (OUTPATIENT)
Dept: LAB | Facility: CLINIC | Age: 44
End: 2024-04-25
Payer: COMMERCIAL

## 2024-04-25 DIAGNOSIS — D12.2 BENIGN NEOPLASM OF ASCENDING COLON: ICD-10-CM

## 2024-04-25 DIAGNOSIS — Z12.11 ENCOUNTER FOR SCREENING FOR MALIGNANT NEOPLASM OF COLON: ICD-10-CM

## 2024-04-25 DIAGNOSIS — K57.30 DIVERTICULOSIS OF LARGE INTESTINE WITHOUT PERFORATION OR ABSCESS WITHOUT BLEEDING: ICD-10-CM

## 2024-04-29 LAB
PATH REPORT.COMMENTS IMP SPEC: NORMAL
PATH REPORT.FINAL DX SPEC: NORMAL
PATH REPORT.GROSS SPEC: NORMAL
PATH REPORT.MICROSCOPIC SPEC OTHER STN: NORMAL
PATH REPORT.RELEVANT HX SPEC: NORMAL
PHOTO IMAGE: NORMAL

## 2024-06-18 ENCOUNTER — MYC REFILL (OUTPATIENT)
Dept: FAMILY MEDICINE | Facility: CLINIC | Age: 44
End: 2024-06-18
Payer: COMMERCIAL

## 2024-06-18 DIAGNOSIS — L70.9 ACNE, UNSPECIFIED ACNE TYPE: ICD-10-CM

## 2024-06-18 RX ORDER — CLINDAMYCIN PHOSPHATE 10 UG/ML
LOTION TOPICAL 2 TIMES DAILY
Qty: 60 ML | Refills: 0 | Status: SHIPPED | OUTPATIENT
Start: 2024-06-18

## 2025-03-15 ENCOUNTER — HEALTH MAINTENANCE LETTER (OUTPATIENT)
Age: 45
End: 2025-03-15